# Patient Record
Sex: FEMALE | Race: WHITE | Employment: OTHER | URBAN - NONMETROPOLITAN AREA
[De-identification: names, ages, dates, MRNs, and addresses within clinical notes are randomized per-mention and may not be internally consistent; named-entity substitution may affect disease eponyms.]

---

## 2020-12-31 ENCOUNTER — HOSPITAL ENCOUNTER (OUTPATIENT)
Age: 55
Discharge: HOME | End: 2020-12-31
Payer: MEDICAID

## 2020-12-31 PROCEDURE — U0003 INFECTIOUS AGENT DETECTION BY NUCLEIC ACID (DNA OR RNA); SEVERE ACUTE RESPIRATORY SYNDROME CORONAVIRUS 2 (SARS-COV-2) (CORONAVIRUS DISEASE [COVID-19]), AMPLIFIED PROBE TECHNIQUE, MAKING USE OF HIGH THROUGHPUT TECHNOLOGIES AS DESCRIBED BY CMS-2020-01-R: HCPCS

## 2021-01-05 LAB — SARS-COV-2: NOT DETECTED

## 2022-07-20 ENCOUNTER — APPOINTMENT (OUTPATIENT)
Dept: MRI IMAGING | Age: 57
DRG: 054 | End: 2022-07-20
Attending: INTERNAL MEDICINE
Payer: MEDICAID

## 2022-07-20 ENCOUNTER — HOSPITAL ENCOUNTER (INPATIENT)
Age: 57
LOS: 2 days | Discharge: HOME HEALTH | DRG: 054 | End: 2022-07-22
Attending: INTERNAL MEDICINE | Admitting: INTERNAL MEDICINE
Payer: MEDICAID

## 2022-07-20 ENCOUNTER — APPOINTMENT (OUTPATIENT)
Dept: ULTRASOUND IMAGING | Age: 57
DRG: 054 | End: 2022-07-20
Attending: INTERNAL MEDICINE
Payer: MEDICAID

## 2022-07-20 DIAGNOSIS — R29.90 STROKE-LIKE SYMPTOMS: ICD-10-CM

## 2022-07-20 DIAGNOSIS — G43.009 ATYPICAL MIGRAINE: Primary | ICD-10-CM

## 2022-07-20 LAB
ANION GAP SERPL CALCULATED.3IONS-SCNC: 7 MEQ/L (ref 8–16)
AVERAGE GLUCOSE: 117 MG/DL (ref 70–126)
BUN BLDV-MCNC: 17 MG/DL (ref 7–22)
CALCIUM SERPL-MCNC: 9.3 MG/DL (ref 8.5–10.5)
CHLORIDE BLD-SCNC: 106 MEQ/L (ref 98–111)
CHOLESTEROL, TOTAL: 119 MG/DL (ref 100–199)
CO2: 27 MEQ/L (ref 23–33)
CREAT SERPL-MCNC: 0.8 MG/DL (ref 0.4–1.2)
EKG ATRIAL RATE: 66 BPM
EKG ATRIAL RATE: 68 BPM
EKG P AXIS: 57 DEGREES
EKG P AXIS: 66 DEGREES
EKG P-R INTERVAL: 168 MS
EKG P-R INTERVAL: 168 MS
EKG Q-T INTERVAL: 470 MS
EKG Q-T INTERVAL: 490 MS
EKG QRS DURATION: 154 MS
EKG QRS DURATION: 158 MS
EKG QTC CALCULATION (BAZETT): 499 MS
EKG QTC CALCULATION (BAZETT): 513 MS
EKG R AXIS: -13 DEGREES
EKG R AXIS: -16 DEGREES
EKG T AXIS: 145 DEGREES
EKG T AXIS: 158 DEGREES
EKG VENTRICULAR RATE: 66 BPM
EKG VENTRICULAR RATE: 68 BPM
ERYTHROCYTE [DISTWIDTH] IN BLOOD BY AUTOMATED COUNT: 13.3 % (ref 11.5–14.5)
ERYTHROCYTE [DISTWIDTH] IN BLOOD BY AUTOMATED COUNT: 45.3 FL (ref 35–45)
GFR SERPL CREATININE-BSD FRML MDRD: 74 ML/MIN/1.73M2
GLUCOSE BLD-MCNC: 99 MG/DL (ref 70–108)
HBA1C MFR BLD: 5.9 % (ref 4.4–6.4)
HCT VFR BLD CALC: 43.4 % (ref 37–47)
HDLC SERPL-MCNC: 35 MG/DL
HEMOGLOBIN: 14.1 GM/DL (ref 12–16)
LDL CHOLESTEROL CALCULATED: 55 MG/DL
LV EF: 55 %
LVEF MODALITY: NORMAL
MCH RBC QN AUTO: 30.3 PG (ref 26–33)
MCHC RBC AUTO-ENTMCNC: 32.5 GM/DL (ref 32.2–35.5)
MCV RBC AUTO: 93.1 FL (ref 81–99)
PLATELET # BLD: 322 THOU/MM3 (ref 130–400)
PMV BLD AUTO: 10.4 FL (ref 9.4–12.4)
POTASSIUM REFLEX MAGNESIUM: 4.6 MEQ/L (ref 3.5–5.2)
RBC # BLD: 4.66 MILL/MM3 (ref 4.2–5.4)
SODIUM BLD-SCNC: 140 MEQ/L (ref 135–145)
TRIGL SERPL-MCNC: 143 MG/DL (ref 0–199)
TSH SERPL DL<=0.05 MIU/L-ACNC: 1.18 UIU/ML (ref 0.4–4.2)
WBC # BLD: 10.5 THOU/MM3 (ref 4.8–10.8)

## 2022-07-20 PROCEDURE — 83036 HEMOGLOBIN GLYCOSYLATED A1C: CPT

## 2022-07-20 PROCEDURE — 85027 COMPLETE CBC AUTOMATED: CPT

## 2022-07-20 PROCEDURE — 76536 US EXAM OF HEAD AND NECK: CPT

## 2022-07-20 PROCEDURE — 70551 MRI BRAIN STEM W/O DYE: CPT

## 2022-07-20 PROCEDURE — 84443 ASSAY THYROID STIM HORMONE: CPT

## 2022-07-20 PROCEDURE — 36415 COLL VENOUS BLD VENIPUNCTURE: CPT

## 2022-07-20 PROCEDURE — 2060000000 HC ICU INTERMEDIATE R&B

## 2022-07-20 PROCEDURE — 72148 MRI LUMBAR SPINE W/O DYE: CPT

## 2022-07-20 PROCEDURE — 6360000002 HC RX W HCPCS

## 2022-07-20 PROCEDURE — 80061 LIPID PANEL: CPT

## 2022-07-20 PROCEDURE — 93306 TTE W/DOPPLER COMPLETE: CPT

## 2022-07-20 PROCEDURE — 72146 MRI CHEST SPINE W/O DYE: CPT

## 2022-07-20 PROCEDURE — 99223 1ST HOSP IP/OBS HIGH 75: CPT

## 2022-07-20 PROCEDURE — 72141 MRI NECK SPINE W/O DYE: CPT

## 2022-07-20 PROCEDURE — 6370000000 HC RX 637 (ALT 250 FOR IP)

## 2022-07-20 PROCEDURE — 93010 ELECTROCARDIOGRAM REPORT: CPT | Performed by: INTERNAL MEDICINE

## 2022-07-20 PROCEDURE — 80048 BASIC METABOLIC PNL TOTAL CA: CPT

## 2022-07-20 PROCEDURE — 93005 ELECTROCARDIOGRAM TRACING: CPT

## 2022-07-20 RX ORDER — ASPIRIN 300 MG/1
300 SUPPOSITORY RECTAL DAILY
Status: DISCONTINUED | OUTPATIENT
Start: 2022-07-20 | End: 2022-07-22 | Stop reason: HOSPADM

## 2022-07-20 RX ORDER — AMLODIPINE BESYLATE 10 MG/1
10 TABLET ORAL 2 TIMES DAILY
Status: DISCONTINUED | OUTPATIENT
Start: 2022-07-20 | End: 2022-07-22 | Stop reason: HOSPADM

## 2022-07-20 RX ORDER — ASPIRIN 81 MG/1
81 TABLET ORAL DAILY
Status: DISCONTINUED | OUTPATIENT
Start: 2022-07-20 | End: 2022-07-22 | Stop reason: HOSPADM

## 2022-07-20 RX ORDER — ONDANSETRON 2 MG/ML
4 INJECTION INTRAMUSCULAR; INTRAVENOUS EVERY 6 HOURS PRN
Status: DISCONTINUED | OUTPATIENT
Start: 2022-07-20 | End: 2022-07-22 | Stop reason: HOSPADM

## 2022-07-20 RX ORDER — SPIRONOLACTONE 50 MG/1
50 TABLET, FILM COATED ORAL DAILY
COMMUNITY

## 2022-07-20 RX ORDER — POLYETHYLENE GLYCOL 3350 17 G/17G
17 POWDER, FOR SOLUTION ORAL DAILY PRN
Status: DISCONTINUED | OUTPATIENT
Start: 2022-07-20 | End: 2022-07-22 | Stop reason: HOSPADM

## 2022-07-20 RX ORDER — ACETAMINOPHEN 325 MG/1
650 TABLET ORAL EVERY 4 HOURS PRN
Status: DISCONTINUED | OUTPATIENT
Start: 2022-07-20 | End: 2022-07-22 | Stop reason: HOSPADM

## 2022-07-20 RX ORDER — CARVEDILOL 25 MG/1
25 TABLET ORAL 2 TIMES DAILY WITH MEALS
COMMUNITY

## 2022-07-20 RX ORDER — ENOXAPARIN SODIUM 100 MG/ML
40 INJECTION SUBCUTANEOUS EVERY 24 HOURS
Status: DISCONTINUED | OUTPATIENT
Start: 2022-07-20 | End: 2022-07-22 | Stop reason: HOSPADM

## 2022-07-20 RX ORDER — HYDRALAZINE HYDROCHLORIDE 50 MG/1
50 TABLET, FILM COATED ORAL 3 TIMES DAILY
Status: ON HOLD | COMMUNITY
End: 2022-07-22 | Stop reason: SDUPTHER

## 2022-07-20 RX ORDER — ATORVASTATIN CALCIUM 80 MG/1
80 TABLET, FILM COATED ORAL DAILY
Status: DISCONTINUED | OUTPATIENT
Start: 2022-07-20 | End: 2022-07-22 | Stop reason: HOSPADM

## 2022-07-20 RX ORDER — CLOPIDOGREL BISULFATE 75 MG/1
75 TABLET ORAL DAILY
Status: DISCONTINUED | OUTPATIENT
Start: 2022-07-20 | End: 2022-07-22 | Stop reason: HOSPADM

## 2022-07-20 RX ORDER — ATORVASTATIN CALCIUM 80 MG/1
80 TABLET, FILM COATED ORAL DAILY
COMMUNITY

## 2022-07-20 RX ORDER — LISINOPRIL 40 MG/1
40 TABLET ORAL DAILY
Status: DISCONTINUED | OUTPATIENT
Start: 2022-07-20 | End: 2022-07-22 | Stop reason: HOSPADM

## 2022-07-20 RX ORDER — CARVEDILOL 25 MG/1
25 TABLET ORAL 2 TIMES DAILY WITH MEALS
Status: DISCONTINUED | OUTPATIENT
Start: 2022-07-20 | End: 2022-07-22 | Stop reason: HOSPADM

## 2022-07-20 RX ORDER — SPIRONOLACTONE 25 MG/1
50 TABLET ORAL DAILY
Status: DISCONTINUED | OUTPATIENT
Start: 2022-07-20 | End: 2022-07-22 | Stop reason: HOSPADM

## 2022-07-20 RX ORDER — LISINOPRIL 40 MG/1
40 TABLET ORAL DAILY
Status: ON HOLD | COMMUNITY
End: 2022-07-22 | Stop reason: SDUPTHER

## 2022-07-20 RX ORDER — AMLODIPINE BESYLATE 5 MG/1
10 TABLET ORAL 2 TIMES DAILY
COMMUNITY

## 2022-07-20 RX ORDER — MAGNESIUM SULFATE IN WATER 40 MG/ML
2000 INJECTION, SOLUTION INTRAVENOUS PRN
Status: DISCONTINUED | OUTPATIENT
Start: 2022-07-20 | End: 2022-07-22 | Stop reason: HOSPADM

## 2022-07-20 RX ORDER — POTASSIUM CHLORIDE 20 MEQ/1
40 TABLET, EXTENDED RELEASE ORAL PRN
Status: DISCONTINUED | OUTPATIENT
Start: 2022-07-20 | End: 2022-07-22 | Stop reason: HOSPADM

## 2022-07-20 RX ORDER — POTASSIUM CHLORIDE 7.45 MG/ML
10 INJECTION INTRAVENOUS PRN
Status: DISCONTINUED | OUTPATIENT
Start: 2022-07-20 | End: 2022-07-22 | Stop reason: HOSPADM

## 2022-07-20 RX ORDER — NITROGLYCERIN 0.3 MG/1
0.3 TABLET SUBLINGUAL EVERY 5 MIN PRN
COMMUNITY

## 2022-07-20 RX ORDER — ONDANSETRON 4 MG/1
4 TABLET, ORALLY DISINTEGRATING ORAL EVERY 8 HOURS PRN
Status: DISCONTINUED | OUTPATIENT
Start: 2022-07-20 | End: 2022-07-22 | Stop reason: HOSPADM

## 2022-07-20 RX ORDER — CLOPIDOGREL BISULFATE 75 MG/1
75 TABLET ORAL DAILY
COMMUNITY

## 2022-07-20 RX ADMIN — ENOXAPARIN SODIUM 40 MG: 100 INJECTION SUBCUTANEOUS at 21:41

## 2022-07-20 RX ADMIN — CARVEDILOL 25 MG: 25 TABLET, FILM COATED ORAL at 16:45

## 2022-07-20 RX ADMIN — ASPIRIN 81 MG: 81 TABLET, COATED ORAL at 14:40

## 2022-07-20 RX ADMIN — ATORVASTATIN CALCIUM 80 MG: 80 TABLET, FILM COATED ORAL at 16:45

## 2022-07-20 RX ADMIN — ACETAMINOPHEN 325MG 650 MG: 325 TABLET ORAL at 16:37

## 2022-07-20 RX ADMIN — CLOPIDOGREL BISULFATE 75 MG: 75 TABLET ORAL at 16:37

## 2022-07-20 ASSESSMENT — ENCOUNTER SYMPTOMS
DIARRHEA: 0
VOMITING: 0
RHINORRHEA: 0
ABDOMINAL PAIN: 0
SHORTNESS OF BREATH: 0
CONSTIPATION: 0
CHEST TIGHTNESS: 0
EYE PAIN: 0
SORE THROAT: 0
NAUSEA: 0

## 2022-07-20 ASSESSMENT — PAIN SCALES - GENERAL: PAINLEVEL_OUTOF10: 0

## 2022-07-20 ASSESSMENT — PAIN DESCRIPTION - LOCATION: LOCATION: HEAD

## 2022-07-20 ASSESSMENT — LIFESTYLE VARIABLES
HOW MANY STANDARD DRINKS CONTAINING ALCOHOL DO YOU HAVE ON A TYPICAL DAY: PATIENT DOES NOT DRINK
HOW OFTEN DO YOU HAVE A DRINK CONTAINING ALCOHOL: NEVER

## 2022-07-20 NOTE — PLAN OF CARE
Problem: Discharge Planning  Goal: Discharge to home or other facility with appropriate resources  Outcome: Progressing  Flowsheets  Taken 7/20/2022 1204 by Cathie Vasques RN  Discharge to home or other facility with appropriate resources: Identify barriers to discharge with patient and caregiver     Problem: Pain  Goal: Verbalizes/displays adequate comfort level or baseline comfort level  Outcome: Progressing  Flowsheets (Taken 7/20/2022 1740)  Verbalizes/displays adequate comfort level or baseline comfort level:   Encourage patient to monitor pain and request assistance   Assess pain using appropriate pain scale   Administer analgesics based on type and severity of pain and evaluate response     Problem: Safety - Adult  Goal: Free from fall injury  Outcome: Progressing  Flowsheets (Taken 7/20/2022 1734)  Free From Fall Injury: Instruct family/caregiver on patient safety     Problem: Neurosensory - Adult  Goal: Achieves stable or improved neurological status  Outcome: Progressing  Flowsheets (Taken 7/20/2022 1204)  Achieves stable or improved neurological status: Assess for and report changes in neurological status     Problem: Neurosensory - Adult  Goal: Absence of seizures  Outcome: Progressing  Flowsheets (Taken 7/20/2022 1204)  Absence of seizures: Monitor for seizure activity.   If seizure occurs, document type and location of movements and any associated apnea     Problem: Neurosensory - Adult  Goal: Remains free of injury related to seizures activity  Outcome: Progressing  Flowsheets (Taken 7/20/2022 1204)  Remains free of injury related to seizure activity: Maintain airway, patient safety  and administer oxygen as ordered     Problem: Neurosensory - Adult  Goal: Achieves maximal functionality and self care  Outcome: Progressing  Flowsheets (Taken 7/20/2022 1204)  Achieves maximal functionality and self care: Monitor swallowing and airway patency with patient fatigue and changes in neurological status Problem: Skin/Tissue Integrity - Adult  Goal: Skin integrity remains intact  Outcome: Progressing  Flowsheets  Taken 7/20/2022 1739  Skin Integrity Remains Intact: Monitor for areas of redness and/or skin breakdown  Taken 7/20/2022 1204  Skin Integrity Remains Intact: Monitor for areas of redness and/or skin breakdown     Problem: Skin/Tissue Integrity - Adult  Goal: Incisions, wounds, or drain sites healing without S/S of infection  Outcome: Progressing  Flowsheets  Taken 7/20/2022 1739  Incisions, Wounds, or Drain Sites Healing Without Sign and Symptoms of Infection: ADMISSION and DAILY: Assess and document risk factors for pressure ulcer development  Taken 7/20/2022 1204  Incisions, Wounds, or Drain Sites Healing Without Sign and Symptoms of Infection: ADMISSION and DAILY: Assess and document risk factors for pressure ulcer development     Problem: Skin/Tissue Integrity - Adult  Goal: Oral mucous membranes remain intact  Outcome: Progressing  Flowsheets  Taken 7/20/2022 1739  Oral Mucous Membranes Remain Intact: Assess oral mucosa and hygiene practices  Taken 7/20/2022 1204  Oral Mucous Membranes Remain Intact:   Assess oral mucosa and hygiene practices   Implement preventative oral hygiene regimen     Problem: Confusion  Goal: Confusion, delirium, dementia, or psychosis is improved or at baseline  Description: INTERVENTIONS:  1.  Assess for possible contributors to thought disturbance, including medications, impaired vision or hearing, underlying metabolic abnormalities, dehydration, psychiatric diagnoses, and notify attending LIP  Outcome: Progressing  Flowsheets (Taken 7/20/2022 1204)  Effect of thought disturbance (confusion, delirium, dementia, or psychosis) are managed with adequate functional status: Monitor and intervene to maintain adequate nutrition, hydration, elimination, sleep and activity     Problem: Abuse/Neglect  Goal: Pt/Caregiver aware of resources to assist with issues of abuse and neglect  Description: INTERVENTIONS:  1. Assess for level of risk and safety  2. Initiate referral to Social Work and notify Licensed Independent Practictioner ([facility])  3. Provide appropriate education and resources to patient and/or family  4. Initiate referral to Adult MISHA Arroyo, as appropriate  5. Initiate referral to CARMEN Rossi, as appropriate  6. Offer to have the patient's the patient's chart marked as Non-disclosed/Privacy patient for phone inquiries, as appropriate  7. Provide emotional support, including active listening and acknowledgment of concerns  Outcome: Progressing  Flowsheets (Taken 7/20/2022 1204)  Patient/caregiver aware of resources to assist with issues of abuse and neglect: Assess for level of risk and safety     Problem: ABCDS Injury Assessment  Goal: Absence of physical injury  Outcome: Progressing  Flowsheets (Taken 7/20/2022 1740)  Absence of Physical Injury: Implement safety measures based on patient assessment     Care plan reviewed with patient. Patient verbalize understanding of the plan of care and contribute to goal setting.

## 2022-07-20 NOTE — H&P
Hospitalist - History & Physical      Patient: Sorin Pena    Unit/Bed:4A-19/019-A  YOB: 1965  MRN: [id number]   Acct: [de-identified]   PCP: Elayne Dubin, APRN - CNP    Date of Service: Pt seen/examined on 07/20/22  and Admitted to Inpatient with expected LOS greater than two midnights due to medical therapy. Chief Complaint:  numbness and tingling of face and right leg pain    Assessment and Plan:  Concern for stroke:    Direct admit from [facility]. Pt reports sudden onset facial pain in the right CN V1, V2 distribution, right radicular leg pain, 1 episode of confusion and disorientation this AM. Confusion and disorientation have resolved. No new weakness or other gross neurologic abnormalities appreciated on exam. Last known well 10 pm 7/18. Imaging uploaded to EMR, CT head WO contrast at MR reveals no acute intracranial hemorrhage or mass effect; age-indeterminate infarct within anterior limb of right internal capsule. CT angiogram of neck reveals 1) scattered calcific plaque without flow-limiting stenosis in anterior circulation; patent vertebral arteries. 2) Subtle atherosclerotic irregularity involving mid and distal PICA distribution on the left without large vessel occlusion. 3) Low density in the lentiform nucleus and internal capsule on right with adjacent compensatory enlargement of ventricular system consistent with an area of chronic infarction. Given total 324mg ASA yesterday. Neurology consulted to  consider MRI and further workup and recommendations. ECHO with bubble study ordered. PT/OT. Holding antihypertensives for now to allow for permissive HTN. BMP, CBC, Lipid panel, TSH, Vit B12 and folate ordered. Hypodense mass of left lobe of thyroid:   Noted on the CTA neck at MR. Mass measures 22.91 x 13.41 mm in diameter. TSH with reflex, Thyroid US ordered. Essential HTN:    BP appears controlled right now, however noted to be accelerated upon presentation at MR.  Hold lisinopril, amlodipine, hydralazine. Will continue to monitor. CAD:   Pt does not endorse chest pain at this time. EKG shows NSR with possible Left atrial enlargement, LBBB; no acute ischemic changes. LBBB noted on previous EKG in 10/2021. Trop negative x 2 at MR. Continue home meds. Continue to monitor. Telemetry. HFrEF:    Stress test at [facility] 10/2021 reveals EF 49%. Pt appears compensated today. Continue home meds. Daily weights, I&Os. COPD:    At baseline on RA. Hx of smoking, currently smokes 10 cigarettes per day. History Of Present Illness:    Dereck is a 65 y/o  female with a PMHx of CAD, COPD, HLD, HTN who [facility] admit today for the evaluation of numbness and tingling of face and intermittent right leg pain. Pt reports yesterday morning she woke up from sleep and noticed head pain that was located more on the right side of her face in the forehead and around her right eye, that occasionally would radiate to the back of the head. She denies vision changes, but states she also has associated right eye tearing. This morning she also experienced a self-limiting episode of confusion and disorientation, and significant generalized weakness for which she was unable to get out of bed without assistance. She also notes new right hip pain that is intermittent. The pain is severe, radiates down her right leg. She denies weakness with walking, denies alleviating or aggravating factors. Pt denies trauma. She reports smoking 10 cigarettes per day. Denies illicit drug use. She otherwise denies chest pain, shortness of breath, recent illness, fever, chills, abdominal pain, nausea or vomiting.        Past Medical History:        Diagnosis Date    CAD (coronary artery disease)     COPD (chronic obstructive pulmonary disease) ([facility] [address]     Hyperlipidemia     Hypertension        Past Surgical History:        Procedure Laterality Date    BREAST SURGERY      CARDIAC SURGERY Home Medications:   No current facility-administered medications on file prior to encounter. No current outpatient medications on file prior to encounter. Allergies:    Patient has no known allergies. Social History:    reports that she has been smoking cigarettes. She has a 13.00 pack-year smoking history. She has never used smokeless tobacco. She reports that she does not drink alcohol and does not use drugs. Family History:   History reviewed. No pertinent family history. Diet:  Diet NPO    Review of systems:     Review of Systems   Constitutional:  Positive for activity change. Negative for appetite change, chills, fatigue and fever. HENT:  Negative for congestion, rhinorrhea and sore throat. Eyes:  Negative for pain and visual disturbance. Respiratory:  Negative for chest tightness and shortness of breath. Cardiovascular:  Negative for chest pain and leg swelling. Gastrointestinal:  Negative for abdominal pain, constipation, diarrhea, nausea and vomiting. Genitourinary:  Negative for frequency and urgency. Musculoskeletal:  Negative for gait problem and myalgias. Neurological:  Positive for numbness. Negative for dizziness, seizures, syncope, facial asymmetry, speech difficulty, weakness, light-headedness and headaches. Psychiatric/Behavioral:  Positive for confusion. The patient is not nervous/anxious. PHYSICAL EXAM:  /79   Pulse 73   Temp 97.6 °F (36.4 °C) (Oral)   Resp 16   SpO2 96%   General appearance: No apparent distress. Appears stated age and cooperative. Skin: Skin color, texture, turgor normal.  No rashes or lesions. HEENT: Normal cephalic, atraumatic without obvious deformity. Pupils equal, round, and reactive to light. Extra-ocular muscles intact. Conjunctivae/corneas clear. Neck: Trachea midline. Supple, with full range of motion. No jugular venous distention. Cardiovascular: Regular rate and rhythm with normal S1/S2.  No murmurs, rubs or gallops. Respiratory:  Normal respiratory effort. Clear to auscultation, bilaterally without rales, wheezes, or rhonchi. Abdomen: Soft, non-tender, non-distended. Normal bowel sounds. Musculoskeletal: Scant edema noted in LE bilaterally. No weakness or instability noted. No  erythema, or gross deformity noted. Vascular:  Pulses +2 palpable, equal bilaterally. Neurologic:  CN II-XII grossly intact, however paraesthesias noted in CN V1, V2. No weakness noted on exam in the upper or lower extremities. Neurovascularly intact without any focal sensory/motor deficits. Psychiatric: Alert and oriented, thought content appropriate, normal insight      Labs:   No results for input(s): WBC, HGB, HCT, PLT in the last 72 hours. No results for input(s): NA, K, CL, CO2, BUN, CREATININE, CALCIUM, PHOS in the last 72 hours. Invalid input(s): MAGNES  No results for input(s): AST, ALT, BILIDIR, BILITOT, ALKPHOS in the last 72 hours. No results for input(s): INR in the last 72 hours. No results for input(s): [place] Pears in the last 72 hours. Urinalysis:    No results found for: José Miguel Drone, BACTERIA, RBCUA, BLOODU, [address], [address]    Radiology:   No orders to display     No results found.       EKG:  NSR, Possible left atrial enlargment, LBBB    Electronically signed by CHARLES PEDERSEN [facility] PA-C on 7/20/2022 at 12:29 PM

## 2022-07-20 NOTE — CONSULTS
Neurology Consult Note    Date:7/21/2022       [id number]  Patient Name:Henna Birmingham     YOB: 1965     Age:56 y.o. Requesting Physician: Christine Steve PA-C     Reason for Consult:  Evaluate for stroke-like symptoms    Chief Complaint: Numbness and tingling of right side of face, headache, bilateral lower extremity pain and weakness    Subjective     Arpita Elizalde is a 64 y.o. female with a history of CAD s/p stent placement x5, COPD, HLD, HTN, MI who presents to [facility] as a direct admit from [facility] for the evaluation of paresthesias of right side of face and intermittent bilateral leg pain and weakness. Patient reports symptoms starting yesterday morning when she woke up with a right-sided headache with radiation occipitally, right-sided neck pain, intermittent bilateral visual changes, disorientation, dizziness, increased confusion, chest pain, slurred and garbled speech and generalized weakness. She denies any known history of stroke, but reports family history of stroke: mother, father, brother (age 39). Patient admits to extensive smoking history since she was 15years old. She currently smokes half a pack a day, but used to be a 2 pack/day smoker. Associated symptoms include low back pain with radicular symptoms, incontinence of bladder. Also admits to dysuria, flank pain, increased frequency of urination x1 week. Reports resolution of confusion disorientation, CT head without contrast at outside facility negative for acute findings, age-indeterminate infarct within anterior limb of right internal capsule.  CTA of the neck showing scattered calcified plaques without flow-limiting stenosis of anterior circulation, patent vertebral arteries, subtle atherosclerotic irregularity involving mid and distal PICA distribution on the left without large vessel occlusion, Low density in the lentiform nucleus and internal capsule on right with adjacent compensatory enlargement of ventricular system consistent with an area of chronic infarction. She was loaded with aspirin 324 mg yesterday. Currently on a baby aspirin, Lipitor 80 mg and Plavix 75 mg and reports compliance with these medications prior to presentation to [facility]. Review of Systems   Review of Systems   Constitutional:  Positive for chills, diaphoresis and fatigue. Negative for fever. HENT:  Positive for rhinorrhea and sinus pressure. Negative for sore throat. Eyes:  Positive for pain and visual disturbance. Respiratory:  Positive for cough and shortness of breath. Cardiovascular:  Negative for palpitations. Gastrointestinal:  Positive for constipation. Negative for abdominal pain, nausea and vomiting. Genitourinary:  Positive for dysuria, flank pain and frequency. Musculoskeletal:  Positive for back pain, myalgias and neck pain. Negative for arthralgias. Skin:  Negative for rash. Neurological:  Positive for dizziness, weakness, numbness and headaches. Psychiatric/Behavioral:  Positive for confusion. The patient is not nervous/anxious. Medications   Scheduled Meds:    enoxaparin  40 mg SubCUTAneous Q24H    aspirin  81 mg Oral Daily    Or    aspirin  300 mg Rectal Daily    [Held by provider] amLODIPine  10 mg Oral BID    atorvastatin  80 mg Oral Daily    [Held by provider] carvedilol  25 mg Oral BID WC    clopidogrel  75 mg Oral Daily    [Held by provider] lisinopril  40 mg Oral Daily    [Held by provider] spironolactone  50 mg Oral Daily     Continuous Infusions:   PRN Meds: ondansetron **OR** ondansetron, polyethylene glycol, potassium chloride **OR** potassium alternative oral replacement **OR** potassium chloride, magnesium sulfate, acetaminophen  Medications Prior to Admission:   No current facility-administered medications on file prior to encounter.      Current Outpatient Medications on File Prior to Encounter   Medication Sig Dispense Refill    atorvastatin (LIPITOR) 80 MG tablet Take 80 mg by mouth in the morning. lisinopril (PRINIVIL;ZESTRIL) 40 MG tablet Take 40 mg by mouth in the morning. clopidogrel (PLAVIX) 75 MG tablet Take 75 mg by mouth in the morning. spironolactone (ALDACTONE) 50 MG tablet Take 50 mg by mouth in the morning. amLODIPine (NORVASC) 5 MG tablet Take 10 mg by mouth in the morning and 10 mg in the evening. nitroGLYCERIN (NITROSTAT) 0.3 MG SL tablet Place 0.3 mg under the tongue every 5 minutes as needed for Chest pain up to max of 3 total doses. If no relief after 1 dose, call 911.      hydrALAZINE (APRESOLINE) 50 MG tablet Take 50 mg by mouth in the morning and 50 mg at noon and 50 mg before bedtime. carvedilol (COREG) 25 MG tablet Take 25 mg by mouth in the morning and 25 mg in the evening. Take with meals. Past History    Past Medical History:   has a past medical history of CAD (coronary artery disease), COPD (chronic obstructive pulmonary disease) ([address] Hyperlipidemia, and Hypertension. Social History:   reports that she has been smoking cigarettes. She has a 13.00 pack-year smoking history. She has never used smokeless tobacco. She reports that she does not drink alcohol and does not use drugs. Family History: History reviewed. No pertinent family history. Physical Examination      Vitals:  /74   Pulse 61   Temp 97.7 °F (36.5 °C) (Oral)   Resp 16   Ht 5' 3\" (1.6 m)   Wt 148 lb (67.1 kg)   SpO2 94%   BMI 26.22 kg/m²   Temp (24hrs), Av.7 °F (36.5 °C), Min:97.6 °F (36.4 °C), Max:97.7 °F (36.5 °C)      I/O (24Hr): Intake/Output Summary (Last 24 hours) at 2022 0159  Last data filed at 2022 2337  Gross per 24 hour   Intake 520 ml   Output --   Net 520 ml         Physical Exam  Vitals reviewed. Constitutional:       General: She is not in acute distress. Appearance: Normal appearance. She is not ill-appearing. HENT:      Head: Normocephalic and atraumatic.       Right Ear: External ear normal.      Left Ear: External ear normal.      Nose: Nose normal.      Mouth/Throat:      Mouth: Mucous membranes are moist.      Pharynx: No oropharyngeal exudate or posterior oropharyngeal erythema. Eyes:      Extraocular Movements: Extraocular movements intact and EOM normal.      Pupils: Pupils are equal, round, and reactive to light. Cardiovascular:      Rate and Rhythm: Normal rate and regular rhythm. Pulses: Normal pulses. Pulmonary:      Effort: Pulmonary effort is normal. No respiratory distress. Breath sounds: Normal breath sounds. Abdominal:      General: Bowel sounds are normal.      Palpations: Abdomen is soft. Tenderness: There is no abdominal tenderness. Musculoskeletal:         General: No tenderness. Normal range of motion. Cervical back: Normal range of motion. Right lower leg: No edema. Left lower leg: No edema. Skin:     General: Skin is warm. Findings: No rash. Neurological:      Mental Status: She is alert and oriented to person, place, and time. Coordination: Finger-Nose-Finger Test and Heel to [facility] Test normal.      Deep Tendon Reflexes: Strength normal.      Reflex Scores:       Tricep reflexes are 3+ on the right side and 3+ on the left side. Bicep reflexes are 3+ on the right side and 3+ on the left side. Brachioradialis reflexes are 3+ on the right side and 3+ on the left side. Patellar reflexes are 3+ on the right side and 3+ on the left side. Achilles reflexes are 3+ on the right side and 3+ on the left side. Psychiatric:         Mood and Affect: Mood normal.         Speech: Speech normal.         Behavior: Behavior normal.     Neurologic Exam     Mental Status   Oriented to person, place, and time. Follows 2 step commands. Attention: normal. Concentration: normal.   Speech: speech is normal   Level of consciousness: alert  Normal comprehension. Cranial Nerves     CN II   Visual fields full to confrontation.    Right last 72 hours. LIVER PROFILE:No results for input(s): AST, ALT, BILIDIR, BILITOT, ALKPHOS in the last 72 hours. CHOLESTEROL AND A1C:  Recent Labs     07/20/22  1331   LDLCALC 55   HDL 35   CHOL 119   TRIG 143   LABA1C 5.9      Imaging Last 24 Hours:  ECHO Complete 2D W Doppler W Color    Result Date: 7/20/2022  Transthoracic Echocardiography Report (TTE)  Demographics   Patient Name     Nicole Smith   Gender                Female   MR #             [id number]   Race                                                  Ethnicity   Account #        [de-identified]   Room Number           5456   Accession Number [id number]  Date of Study         07/20/2022   Date of Birth    1965  Referring Physician   Yelena Westbrook PA-C   Age              64 year(s)  Malissa Mejia RDCS                                Interpreting          Jona Yost MD                               Physician             Echo reader of the week  Procedure Type of Study   TTE procedure:ECHOCARDIOGRAM COMPLETE 2D W DOPPLER W COLOR. Procedure Date Date: 07/20/2022 Start: 03:47 PM Study Location: Bedside Technical Quality: Adequate visualization Indications:Possible stroke. Additional Medical History:Hypertension, Hyperlipidemia, COPD, Smoker, Coronary artery disease. Patient Status: Routine Contrast Medium: Bubble Study. Height: 63 inches Weight: 148 pounds BSA: 1.7 m^2 BMI: 26.22 kg/m^2 BP: 105/79 mmHg  Conclusions   Summary  Ejection fraction is visually estimated at 55%. Overall left ventricular function is normal.  Moderate concentric left ventricular hypertrophy. Septal wall asymmetrical left ventricular hypertrophy. Mildly dilated left atrium. Aortic valve appears possibly bicuspid. Aortic valve leaflets are somewhat thickened.   cant exclude some degree of stenosis which is difficult to quantitate due  to elevated LVOT velocity with LVOT obstruction that is significant with  mean gradient 44 mm hg  Mildly dilated left atrium. bubble contrast study with no shunting   Signature   ----------------------------------------------------------------  Electronically signed by Graham Rodrigues MD (Interpreting  physician) on 07/20/2022 at 06:57 PM  ----------------------------------------------------------------   Findings   Mitral Valve  Trace mitral regurgitation is present. Aortic Valve  Aortic valve appears possibly bicuspid. Aortic valve leaflets are somewhat thickened. cant exclude some degree of stenosis which is difficult to quantitate due  to elevated LVOT velocity with LVOT obstruction that is significant with  mean gradient 44 mm hg   Tricuspid Valve  Trivial tricuspid regurgitation visualized. Pulmonic Valve  Trivial pulmonic regurgitation visualized. The pulmonic valve was not well visualized . Left Atrium  Mildly dilated left atrium. bubble contrast study with no shunting   Left Ventricle  Ejection fraction is visually estimated at 55%. Overall left ventricular function is normal.  Moderate concentric left ventricular hypertrophy. Septal wall asymmetrical left ventricular hypertrophy. Right Atrium  Right atrial size was normal.   Right Ventricle  The right ventricular size was normal with normal systolic function and  wall thickness. Pericardial Effusion  The pericardium was normal in appearance with no evidence of a pericardial  effusion. Pleural Effusion  No evidence of pleural effusion. Aorta / Great Vessels  -Aortic root dimension within normal limits.  -The Pulmonary artery is within normal limits. -IVC size is within normal limits with normal respiratory phasic changes.   M-Mode/2D Measurements & Calculations   LV Diastolic    LV Systolic Dimension: 2.6  AV Cusp Separation: 1.8 cmAO  Dimension: 4 cm cm                          Root Dimension: 3.4 cmLA Area:  LV FS:35 %      LV Volume Diastolic: 70 ml  24.5 cm^2  LV PW LV Volume Systolic: 81.4 ml  Diastolic: 1.8  LV EDV/LV EDV Index: 70  cm              ml/41 m^2LV ESV/LV ESV  Septum          Index: 24.6 ml/14 m^2       RV Diastolic Dimension: 2.6 cm  Diastolic: 2 cm EF Calculated: 64.9 %                                              Ascending Aorta: 3.3 cm                                              LA volume/Index: 62.4 ml                                              /37m^2                  LVOT: 2 cm  Doppler Measurements & Calculations   MV Peak E-Wave: 102 cm/s   AV Peak Velocity: 297  MV Peak A-Wave: 112 cm/s   cm/s  MV E/A Ratio: 0.91         AV Peak Gradient:      TV Peak E-Wave: 63.4  MV Peak Gradient: 4.16     35.28 mmHg             cm/s  mmHg                       AV Mean Velocity: 198  TV Peak A-Wave: 41.6                             cm/s                   cm/s  MV Deceleration Time: 407  AV Mean Gradient: 18  msec                       mmHg                   TV Peak Gradient: 1.61                             AV VTI: 66.4 cm        mmHg                                                    TR Velocity:277 cm/s  MV E' Septal Velocity: 3.7                        TR Gradient:30.69 mmHg  cm/s                                              PV Peak Velocity: 59.6  MV A' Septal Velocity: 7.1 IVRT: 82 msec          cm/s  cm/s                                              PV Peak Gradient: 1.42  MV E' Lateral Velocity:                           mmHg  6.9 cm/s  MV A' Lateral Velocity:  9.7 cm/s  E/E' septal: 27.57  E/E' lateral: 14.78  MR Velocity: 530 cm/s  [email or web address] [id number]    MRI CERVICAL SPINE WO CONTRAST    Result Date: 7/20/2022  MR cervical spine without gadolinium Comparison: None Findings: Visualized intracranial contents are unremarkable. No cervical fluid collections or masses.  Cervical cord normal. Status post anterior metallic and interbody musculature intact. Findings at the level of the cervical and lumbar spine are reported separately. No significant abnormality of the thoracic spine. This document has been electronically signed by: Micah Apley, MD on 07/20/2022 10:29 PM    MRI LUMBAR SPINE WO CONTRAST    Result Date: 7/20/2022  MR lumbar spine without gadolinium Comparison: None Findings: No scoliosis or spondylolisthesis. No acute fracture or pathologic bone lesion. Possible chronic pars defects at L5. Small hemangiomas incidentally noted. The conus is at the T12 level. Paraspinous musculature intact. L1-L2: Mild broad-based disc bulge with minimal thecal sac effacement. No significant neural foraminal narrowing. L2-L3: Minimal broad-based disc bulge. No significant central canal or neural foraminal narrowing. L3-L4: Mild broad-based disc bulge with minimal thecal sac effacement. Mild facet osteoarthritis. No significant neural foraminal narrowing. L4-L5: 3 mm central disc protrusion with mild thecal sac effacement. Mild facet osteoarthritis. Mild bilateral neural foraminal narrowing. L5-S1: 2 mm central disc protrusion with mild thecal sac effacement. Mild facet osteoarthritis. Mild bilateral neural foraminal narrowing. 1. Possible chronic pars defects at L5. No associated alignment abnormality. 2. Tiny central disc protrusions at L4-L5 and L5-S1 with mild thecal sac effacement. 3. Additional chronic findings as above. This document has been electronically signed by: Micah Apley, MD on 07/20/2022 10:46 PM    CT COMPARISON OF OUTSIDE FILMS    Result Date: 7/20/2022  Radiology exam is complete. No Radiologist dictation. Please follow up with ordering provider. CT COMPARISON OF OUTSIDE FILMS    Result Date: 7/20/2022  Radiology exam is complete. No Radiologist dictation. Please follow up with ordering provider. MRI BRAIN WO CONTRAST    Result Date: 7/20/2022  MR Brain without gadolinium.  Comparison: None Findings: No restricted diffusion. No intracranial mass or hemorrhage. 1.4 cm chronic lacunar type infarct involving the right thalamus and right basal ganglia. Small focus of chronic infarction within the right cerebellar hemisphere. Involutional change of brain parenchyma, compatible with age. Numerous scattered foci of T2 hyperintensity within the white matter with involvement of both the superficial and deep white matter No midline shift. No hydrocephalus. Vascular flow voids are intact. The orbits are normal. The sinuses and mastoid air cells are clear. No focal bone lesion. 1. Numerous foci of T2 hyperintensity within the white matter. This may be on the basis of advanced small vessel ischemic change or a demyelinating process. 2. Chronic lacunar infarct involving the right basal ganglia and right thalamus. Small chronic infarct within the right cerebellar hemisphere. Flow voids This document has been electronically signed by: Osmany Rojas MD on 07/20/2022 10:41 PM    XR COMPARISON OF OUTSIDE FILMS    Result Date: 7/20/2022  Radiology exam is complete. No Radiologist dictation. Please follow up with ordering provider. Assessment and Plan:        Acute sensory changes, bilateral lower extremity pain and weakness, incontinence in the setting of low back pain and hyperreflexia  CT head without contrast from outside facility: Negative for acute findings, age indeterminate infarct with anterior limb of right internal capsule  CTA neck from outside facility: Negative for large vessel occlusion, scattered calcified plaque without flow-limiting stenosis of anterior circulation, atherosclerotic irregularity of PICA distribution. Chronic infarction right lentiform nucleus and anterior with enlargement of ventricular system  2D echo ordered. PT/OT consult  Continue ASA 81 mg, Plavix 75 mg, Lipitor 80 mg.    Maintain adequate fluid hydration  Antihypertensives held by hospitalist  MRI brain without contrast: Numerous foci of T2 hyperintensity within the white matter, demyelinating process versus small vessel ischemic change. chronic lacunar infarcts of the right basal ganglia, right thalamus, right cerebellar hemisphere, full read above. MRI C-spine: S/p anterior metallic and interbody fusion C5-C6. Grade 1 anterolisthesis and 4 mm central disc protrusion at C3-C4 with moderate mass-effect on the thecal sac and mild mass-effect on the anterior aspect of the cervical spinal cord. There is also moderate osteoarthritis of the left facet joint contributing to moderate stenosis of the left neural foramen at this level. MRI T-spine: Negative for significant abnormality. MRI L-spine:Possible chronic pars defects at L5 without associated alignment abnormality. Small central disc protrusions at L4-L5 and L5-S1 with mild thecal sac effacement  Recommend infectious workup. Particularly UA in setting of dysuria/frequency/incontinence. NIHSS every shift. Neurochecks every 4 hours, unless otherwise specified per floor. Call with neurologic changes. Neurology will continue to follow. Further recommendations to follow in the morning. This patient was seen & evaluated in conjunction with Dr. Phan Hernandez who is in agreement with assessment and plan.      Electronically signed by Jose Hand PA-C on 7/21/22 at 2:19 AM EDT

## 2022-07-21 ENCOUNTER — APPOINTMENT (OUTPATIENT)
Dept: CT IMAGING | Age: 57
DRG: 054 | End: 2022-07-21
Attending: INTERNAL MEDICINE
Payer: MEDICAID

## 2022-07-21 LAB
ANION GAP SERPL CALCULATED.3IONS-SCNC: 9 MEQ/L (ref 8–16)
BACTERIA: ABNORMAL
BILIRUBIN URINE: NEGATIVE
BLOOD, URINE: NEGATIVE
BUN BLDV-MCNC: 13 MG/DL (ref 7–22)
CALCIUM SERPL-MCNC: 8.4 MG/DL (ref 8.5–10.5)
CASTS: ABNORMAL /LPF
CASTS: ABNORMAL /LPF
CHARACTER, URINE: CLEAR
CHLORIDE BLD-SCNC: 108 MEQ/L (ref 98–111)
CO2: 27 MEQ/L (ref 23–33)
COLOR: YELLOW
CREAT SERPL-MCNC: 0.6 MG/DL (ref 0.4–1.2)
CRYSTALS: ABNORMAL
EKG ATRIAL RATE: 62 BPM
EKG P AXIS: 55 DEGREES
EKG P-R INTERVAL: 174 MS
EKG Q-T INTERVAL: 480 MS
EKG QRS DURATION: 162 MS
EKG QTC CALCULATION (BAZETT): 487 MS
EKG R AXIS: -25 DEGREES
EKG T AXIS: 150 DEGREES
EKG VENTRICULAR RATE: 62 BPM
EPITHELIAL CELLS, UA: ABNORMAL /HPF
ERYTHROCYTE [DISTWIDTH] IN BLOOD BY AUTOMATED COUNT: 13.4 % (ref 11.5–14.5)
ERYTHROCYTE [DISTWIDTH] IN BLOOD BY AUTOMATED COUNT: 46 FL (ref 35–45)
FOLATE: 13.1 NG/ML (ref 4.8–24.2)
GFR SERPL CREATININE-BSD FRML MDRD: > 90 ML/MIN/1.73M2
GLUCOSE BLD-MCNC: 114 MG/DL (ref 70–108)
GLUCOSE, URINE: NEGATIVE MG/DL
HCT VFR BLD CALC: 43.6 % (ref 37–47)
HEMOGLOBIN: 14.1 GM/DL (ref 12–16)
KETONES, URINE: ABNORMAL
LEUKOCYTE ESTERASE, URINE: NEGATIVE
MCH RBC QN AUTO: 30.5 PG (ref 26–33)
MCHC RBC AUTO-ENTMCNC: 32.3 GM/DL (ref 32.2–35.5)
MCV RBC AUTO: 94.4 FL (ref 81–99)
MISCELLANEOUS LAB TEST RESULT: ABNORMAL
NITRITE, URINE: NEGATIVE
PH UA: 5.5 (ref 5–9)
PLATELET # BLD: 313 THOU/MM3 (ref 130–400)
PMV BLD AUTO: 10.3 FL (ref 9.4–12.4)
POTASSIUM REFLEX MAGNESIUM: 3.8 MEQ/L (ref 3.5–5.2)
PROTEIN UA: NEGATIVE MG/DL
RBC # BLD: 4.62 MILL/MM3 (ref 4.2–5.4)
RBC URINE: ABNORMAL /HPF
RENAL EPITHELIAL, UA: ABNORMAL
SODIUM BLD-SCNC: 144 MEQ/L (ref 135–145)
SPECIFIC GRAVITY UA: 1.02 (ref 1–1.03)
UROBILINOGEN, URINE: 1 EU/DL (ref 0–1)
VITAMIN B-12: 349 PG/ML (ref 211–911)
WBC # BLD: 8.7 THOU/MM3 (ref 4.8–10.8)
WBC UA: ABNORMAL /HPF
YEAST: ABNORMAL

## 2022-07-21 PROCEDURE — 97530 THERAPEUTIC ACTIVITIES: CPT

## 2022-07-21 PROCEDURE — 97112 NEUROMUSCULAR REEDUCATION: CPT

## 2022-07-21 PROCEDURE — 6360000002 HC RX W HCPCS

## 2022-07-21 PROCEDURE — 80048 BASIC METABOLIC PNL TOTAL CA: CPT

## 2022-07-21 PROCEDURE — 82607 VITAMIN B-12: CPT

## 2022-07-21 PROCEDURE — 85027 COMPLETE CBC AUTOMATED: CPT

## 2022-07-21 PROCEDURE — 36415 COLL VENOUS BLD VENIPUNCTURE: CPT

## 2022-07-21 PROCEDURE — 93010 ELECTROCARDIOGRAM REPORT: CPT | Performed by: INTERNAL MEDICINE

## 2022-07-21 PROCEDURE — 2060000000 HC ICU INTERMEDIATE R&B

## 2022-07-21 PROCEDURE — 82746 ASSAY OF FOLIC ACID SERUM: CPT

## 2022-07-21 PROCEDURE — 99232 SBSQ HOSP IP/OBS MODERATE 35: CPT | Performed by: SOCIAL WORKER

## 2022-07-21 PROCEDURE — 99232 SBSQ HOSP IP/OBS MODERATE 35: CPT | Performed by: INTERNAL MEDICINE

## 2022-07-21 PROCEDURE — 87086 URINE CULTURE/COLONY COUNT: CPT

## 2022-07-21 PROCEDURE — 72125 CT NECK SPINE W/O DYE: CPT

## 2022-07-21 PROCEDURE — 6370000000 HC RX 637 (ALT 250 FOR IP)

## 2022-07-21 PROCEDURE — 99223 1ST HOSP IP/OBS HIGH 75: CPT | Performed by: NEUROLOGICAL SURGERY

## 2022-07-21 PROCEDURE — 93005 ELECTROCARDIOGRAM TRACING: CPT

## 2022-07-21 PROCEDURE — 81001 URINALYSIS AUTO W/SCOPE: CPT

## 2022-07-21 PROCEDURE — 97116 GAIT TRAINING THERAPY: CPT

## 2022-07-21 PROCEDURE — 97162 PT EVAL MOD COMPLEX 30 MIN: CPT

## 2022-07-21 RX ADMIN — ASPIRIN 81 MG: 81 TABLET, COATED ORAL at 09:34

## 2022-07-21 RX ADMIN — ACETAMINOPHEN 325MG 650 MG: 325 TABLET ORAL at 20:26

## 2022-07-21 RX ADMIN — CLOPIDOGREL BISULFATE 75 MG: 75 TABLET ORAL at 09:34

## 2022-07-21 RX ADMIN — ATORVASTATIN CALCIUM 80 MG: 80 TABLET, FILM COATED ORAL at 09:34

## 2022-07-21 RX ADMIN — ACETAMINOPHEN 325MG 650 MG: 325 TABLET ORAL at 09:34

## 2022-07-21 RX ADMIN — ENOXAPARIN SODIUM 40 MG: 100 INJECTION SUBCUTANEOUS at 20:26

## 2022-07-21 ASSESSMENT — PAIN DESCRIPTION - DESCRIPTORS
DESCRIPTORS: ACHING
DESCRIPTORS: ACHING

## 2022-07-21 ASSESSMENT — ENCOUNTER SYMPTOMS
SORE THROAT: 0
NAUSEA: 0
VOMITING: 0
BACK PAIN: 1
CHEST TIGHTNESS: 0
SHORTNESS OF BREATH: 1
COUGH: 1
ABDOMINAL PAIN: 0
EYE PAIN: 1
RHINORRHEA: 1
CONSTIPATION: 1
SINUS PRESSURE: 1

## 2022-07-21 ASSESSMENT — PAIN SCALES - GENERAL
PAINLEVEL_OUTOF10: 0
PAINLEVEL_OUTOF10: 3
PAINLEVEL_OUTOF10: 1
PAINLEVEL_OUTOF10: 5

## 2022-07-21 ASSESSMENT — PAIN DESCRIPTION - PAIN TYPE: TYPE: ACUTE PAIN

## 2022-07-21 ASSESSMENT — PAIN DESCRIPTION - ORIENTATION
ORIENTATION: RIGHT
ORIENTATION: RIGHT

## 2022-07-21 ASSESSMENT — PAIN DESCRIPTION - FREQUENCY: FREQUENCY: CONTINUOUS

## 2022-07-21 ASSESSMENT — PAIN DESCRIPTION - LOCATION
LOCATION: HEAD
LOCATION: HEAD

## 2022-07-21 ASSESSMENT — PAIN DESCRIPTION - ONSET: ONSET: AWAKENED FROM SLEEP

## 2022-07-21 NOTE — PROGRESS NOTES
Stroke Folder given. What is Stroke/CVA  Signs and Symptoms of stroke (BEFAST)  Treatments for Stroke  Personal Risk Factors for Stroke discussed  Education--Call 911    Patient/family has been educated on their personal risk factors of:  Hypertension  Previous stroke  Hyperlipidemia  smoking cessation  Carotid Artery stenosis    They have been given hand outs on the following medications:    asa  Plavix  statins(Lipitor)  antihypertensive(Hydralazine, lisinopril)    Treatment for stroke includes:  Risk factor modifications  Following the medication regime prescribed by physician      Educated on FAST-Face-Arm-Speech-Time    A stroke is a brain attack. Stroke is a brain injury. It occurs when the brain's blood supply is interrupted. Blood carries oxygen and nutrients to the brain. Without oxygen and nutrients from blood, brain tissue starts to die rapidly. This can happen in less than 10 minutes. A stroke occurs when blood flow to the brain is blocked (called ischemic stroke). This is caused by one of the following:   Sudden decreased blood flow   Damage to a blood vessel supplying blood to the brain can occur suddenly from either:   Injury   A clot that forms and breaks off from another part of the body (such as the heart or neck)   There are certain conditions which predispose people to form blood clots, such as:   Cancer   Pregnancy   Atrial fibrillation   Certain autoimmune diseases   Local blood clot   A build-up of fatty substances ( atherosclerotic plaque ) along the inner lining of the artery causes:   Narrowing of artery   Reduced elasticity   Local inflammation   Blood protein defects leading to increased clotting tendency   Decreased blood flow in the artery   Clot in an artery supplying the brain   Inflammatory conditions in the blood vessels (vasculitis)   A stroke may also occur if a blood vessel breaks and bleeds into or around the brain. This is called hemorrhagic stroke.       This condition needs to be monitored closely. Be sure to keep all appointments. Have exams and blood tests done as directed. Call 911 If Any of the Following Occurs   It is important that you and those around you know the warning signs for stroke. CALL 911 immediately if you have any of the following which may suggest a new stroke:   Sudden weakness or numbness of face, arm, or leg, especially on one side of the body   Sudden confusion   Sudden trouble speaking or understanding   Sudden trouble seeing in one or both eyes   Sudden dizziness, trouble walking, loss of balance, or coordination   Sudden severe headache with no known cause   If you think you have an emergency, CALL 911       To help reduce your risk of stroke, take the following steps:   Eat a well-balanced diet. The DASH diet rich in fruits, vegetables and low-fat dairy foods, and low in saturated fat, total fat, and cholesterolmay help keep your blood pressure in the healthy range. Exercise regularly. Maintain a healthy weight. (Your body mass index should be below 25.)   If you smoke, quit . Drink alcohol in moderation. Moderate is two or fewer drinks per day for men and one or fewer drinks per day for women and older adults. Control your diabetes    All patient/family questions were answered and teach back method was utilized.
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
## 2022-07-21 NOTE — CARE COORDINATION
7/21/22, 8:26 AM EDT  DISCHARGE PLANNING EVALUATION:    Justin Esaon       Admitted: 7/20/2022/ [facility] day: 1   Location: [facility]19/019-A Reason for admit: Stroke-like symptoms [R29.90]   PMH:  has a past medical history of CAD (coronary artery disease), COPD (chronic obstructive pulmonary disease) ([address] Hyperlipidemia, and Hypertension. Procedure:   7/20 MRI Brain WO Contrast 1. Numerous foci of T2 hyperintensity within the white matter. This may be   on the basis of advanced small vessel ischemic change or a demyelinating   process. 2. Chronic lacunar infarct involving the right basal ganglia and right   thalamus. Small chronic infarct within the right cerebellar hemisphere. Flow voids   7/20 ECHO EF 55%  Barriers to Discharge:  From [facility]. Telemetry, PT/OT, Neurology consult, electrolyte replacement protocols, Asa, Lipitor, Plavix, Lovenox. PCP: QUEENIE Mario CNP  Readmission Risk Score: 7.5%  Patient's Healthcare Decision Maker: Patient Declined (Legal Next of Kin Remains as Decision Maker)    Patient Goals/Plan/Treatment Preferences: Met with Aaliyah Archibald. She currently lives at home with her boyfriend an grandson. Plan is to return home at discharge. Will follow clinical course for potential needs and or services. SW consult in place. PCP and insurance verified. Transportation/Food Security/Housekeeping Addressed:  No issues identified.
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
## 2022-07-21 NOTE — PLAN OF CARE
Problem: Discharge Planning  Goal: Discharge to home or other facility with appropriate resources  7/20/2022 2215 by Iwona Cornell RN  Outcome: Progressing  Flowsheets (Taken 7/20/2022 2215)  Discharge to home or other facility with appropriate resources: Identify barriers to discharge with patient and caregiver     Problem: Pain  Goal: Verbalizes/displays adequate comfort level or baseline comfort level  7/20/2022 2215 by Iwona Cornell RN  Flowsheets (Taken 7/20/2022 2215)  Verbalizes/displays adequate comfort level or baseline comfort level:   Encourage patient to monitor pain and request assistance   Assess pain using appropriate pain scale     Problem: Safety - Adult  Goal: Free from fall injury  7/20/2022 2215 by Iwona Cornell RN  Outcome: Progressing  Flowsheets (Taken 7/20/2022 2215)  Free From Fall Injury: Instruct family/caregiver on patient safety     Problem: Neurosensory - Adult  Goal: Achieves stable or improved neurological status  7/20/2022 2215 by Iwona Cornell RN  Outcome: Progressing  Flowsheets (Taken 7/20/2022 2215)  Achieves stable or improved neurological status: Assess for and report changes in neurological status     Problem: Skin/Tissue Integrity - Adult  Goal: Skin integrity remains intact  7/20/2022 2215 by Iwona Cornell RN  Outcome: Progressing  Flowsheets (Taken 7/20/2022 2215)  Skin Integrity Remains Intact: Monitor for areas of redness and/or skin breakdown     Problem: ABCDS Injury Assessment  Goal: Absence of physical injury  7/20/2022 2215 by Iwona Cornell RN  Outcome: Progressing  Flowsheets (Taken 7/20/2022 2215)  Absence of Physical Injury: Implement safety measures based on patient assessment     Problem: Skin/Tissue Integrity  Goal: Absence of new skin breakdown  Description: 1. Monitor for areas of redness and/or skin breakdown  2. Assess vascular access sites hourly  3. Every 4-6 hours minimum:  Change oxygen saturation probe site  4.   Every 4-6 hours:  If on nasal continuous positive airway pressure, respiratory therapy assess nares and determine need for appliance change or resting period. Outcome: Progressing  Note: No signs of skin breakdown. Skin warm, dry, and intact. Mucous membranes pink and moist.  Assistance with turns/ambulation provided PRN. Will continue to monitor. Care plan reviewed with patient. Patient verbalizes understanding of the plan of care and contributed to goal setting.

## 2022-07-21 NOTE — PROGRESS NOTES
[address] [facility],[address] NEUROSCIENCES 4A - 7N-90/124-S    Time In: 3835  Time Out: 7339  Timed Code Treatment Minutes: 23 Minutes  Minutes: 33          Date: 2022  Patient Name: Kurtis Velasco,  Gender:  female        MRN: [id number]  : 1965  (64 y.o.)      Referring Practitioner: Lalo Chavarria PA-C  Diagnosis: stroke-like symptoms  Additional Pertinent Hx: Per H&P : Rubi Chamberlain is a 65 y/o  female with a PMHx of CAD, COPD, HLD, HTN who presents to [facility] via direct admit today for the evaluation of numbness and tingling of face and intermittent right leg pain. Pt reports yesterday morning she woke up from sleep and noticed head pain that was located more on the right side of her face in the forehead and around her right eye, that occasionally would radiate to the back of the head. She denies vision changes, but states she also has associated right eye tearing. This morning she also experienced a self-limiting episode of confusion and disorientation, and significant generalized weakness for which she was unable to get out of bed without assistance. She also notes new right hip pain that is intermittent. The pain is severe, radiates down her right leg. Per Neurology pt presents with Acute sensory changes, bilateral lower extremity pain and weakness, incontinence in the setting of low back pain and hyperreflexia. CT head Negative for acute findings, age indeterminate infarct with anterior limb of right internal capsule. CTA neck Negative for large vessel occlusion, Chronic infarction right lentiform nucleus and anterior with enlargement of ventricular system     Restrictions/Precautions:  Restrictions/Precautions: Fall Risk, General Precautions    Subjective:  Chart Reviewed: Yes  Patient assessed for rehabilitation services?: Yes  Family / Caregiver Present: No  Subjective: RN approved session. Pt agrees for PT evaluation.  Pt denies concern regarding discharge home. Pt states she has had balance problems for a long time, states she does not carry her grandson outside for fear of falling with him. Pt agrees for continued PT upon d/c RN aware. Discussed slowing down, increasing attention to safety and task at hand, and having assistance for improved safety. Trialed a cane this session, pt agrees that this was not a good fit for her to improve her stablity secondary to dual task and increased attention to manage this. General:  Overall Orientation Status: Within Functional Limits (not formally assessed)  Vision: Impaired  Vision Exceptions: Wears glasses at all times  Hearing: Exceptions to [facility]  Hearing Exceptions: Hard of hearing/hearing concerns     Pain: 3-4/10: R side of her head is \"tender\"    Vitals: Heart Rate: 76 bpm, maintained WFL    Social/Functional History:    Lives With: Significant other (grandson)  Type of Home: Apartment  Home Layout: One level  Home Access: Stairs to enter with rails  Entrance Stairs - Number of Steps: 6  Entrance Stairs - Rails: Left  Home Equipment:  (no DME)          Receives Help From: Family        Ambulation Assistance: Independent  Transfer Assistance: Independent    Active : No  Occupation: Unemployed  Additional Comments: Pt's boyfriend works full time, pt has been caring for her 18 month old grandson. Pt states her granddaughter is planning to stay with her until school starts, she is 15. Pt indep with no AD PTA. Pt states her children can assist if needed    OBJECTIVE:  Range of Motion:  Bilateral Lower Extremity: WFL    Strength:  Bilateral Lower Extremity: WFL  4+/5 at all joints     Balance:  Static Sitting Balance:  Stand By Assistance  Dynamic Sitting Balance: Stand By Assistance  Static Standing Balance: Stand By Assistance, Contact Guard Assistance  Dynamic Standing Balance: Minimal Assistance  Min assist provided with the Tinetti. Neuromuscular Facilitation:   The following activities were completed of gait with use of cane with ambulation. No noticeable improvement in gait pattern with use of cane as AD. Discontinued further ambulation trial with cane because of this and pt agrees. Did provide education on risks of reaching for items in her environment for stability, encouraged her not to do this, but to only reach for sturdy objects such as a couch, counter, or kitchen table if she finds herself continuing to do this. Pt states understanding. Stairs:  Contact Guard Assistance, with verbal cues , with increased time for completion  Number of Steps: 6  Height: 6\" step with One Handrail  Pt completed with reciprocal pattern. CGA provided for safety. PT recommending supervision from her boyfriend with stairs initially for safety. Pt receptive to this. Exercise:none this session    Functional Outcome Measures: Completed  Balance Score: 11  Gait Score: 8  Tinetti Total Score: 19/28 with moderate fall risk  AM-PAC Inpatient Mobility without Stair Climbing Raw Score : 15  AM-PAC Inpatient without Stair Climbing T-Scale Score : 43.03    Risk Indicators:  Less than/equal to 18 = high risk  19-23 Moderate risk  Greater than/equal to 24 = low risk    ASSESSMENT:  Activity Tolerance:  Patient tolerance of  treatment: good. Pt tolerated mobility well, required cues and CGA with ambulation and stairs for safety. Pt did not tolerate use of cane with ambulation trial well, discontinued this and recommended slowing down and increasing attention to her environment, having increased assistance, and continuing PT to improve her stability with gait. Pt receptive to this. Pt educated on not ambulating without assist while in the hospital.       Treatment Initiated: Treatment and education initiated within context of evaluation.   Evaluation time included review of current medical information, gathering information related to past medical, social and functional history, completion of standardized testing, formal and informal observation of tasks, assessment of data and development of plan of care and goals. Treatment time included skilled education and facilitation of tasks to increase safety and independence with functional mobility for improved independence and quality of life. Assessment: Body Structures, Functions, Activity Limitations Requiring Skilled Therapeutic Intervention: Decreased functional mobility , Decreased endurance, Decreased safe awareness, Decreased strength, Decreased balance, Decreased posture  Assessment: This patient is a 64 y.o. who presents with stroke like symptoms. This is a decline from the patient's baseline status of indep with no AD, caring for her young grandson and living with her boyfriend. Pt scored a 19/28 indicating a moderate fall risk. The patient is observed to have deficits in strength, balance, activity tolerance, and safety awareness and would benefit from skilled PT services to progress functional mobility, safety awareness, and to decrease overall risk of falls. Pt would benefit from increased assist with ambulation and with stairs, continued PT, and increasing her attention to task and safety for improved safety with mobility. Therapy Prognosis: Good    Requires PT Follow-Up: Yes    Discharge Recommendations:  Discharge Recommendations: Continue to assess pending progress, Therapy recommended at discharge    Patient Education:      .     Patient Education  Education Given To: Patient  Education Provided: Role of Therapy, Plan of Care, Energy Conservation, Equipment, Fall Prevention Strategies, Transfer Training  Education Method: Demonstration, Verbal  Education Outcome: Continued education needed       Equipment Recommendations:  Equipment Needed: No    Plan:  Current Treatment Recommendations: Strengthening, Balance training, Functional mobility training, Transfer training, Stair training, Gait training, Endurance training, Neuromuscular re-education, Home exercise program, Safety education & training, Patient/Caregiver education & training, Equipment evaluation, education, & procurement, Therapeutic activities  Plan:  (5x GM/N)    Goals:  Patient goals : \"I want to quit smoking\"  Short Term Goals  Time Frame for Short term goals: by hospital d/c  Short term goal 1: Pt to demo supine <->sit mod I for ease with getting in and out of bed  Short term goal 2: Pt to complete sit <->stand indep for ease with transfers between surfaces  Short term goal 3: Pt to ambulate >=80' with S and no AD for improved safey in her enviornment  Short term goal 4: Pt to ascend/descend 6 steps with uni HR with SBA for home entry  Short term goal 5: Pt to improve her Tinetti score to >=24/28 to progress to the low fall risk category  Long Term Goals  Time Frame for Long term goals : NA due to short ELOS    Following session, patient left in safe position with all fall risk precautions in place.

## 2022-07-21 NOTE — CONSULTS
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  -However, patient cervical spine findings cannot explain all of patient neurological current symptoms. PLAN:    -Medical management per patient primary.  -Patient can be discharged from neurosurgical perspective once she is cleared with other medical services.   -Follow-up with neurosurgery outpatient clinic after 2 to 3 weeks for reevaluation and to update her recommendation and treatment from neurosurgical perspective.  -I discussed the case in detail with patient and with her nurse. HISTORY OF PRESENT ILLNESS:  Josh Garg is a 64 y.o. female, admitted on :7/20/2022 12:09 PM    This is  a 64year old female with a PMHx of CAD, COPD, HLD, HTN who presented to [facility] as a  direct admit  for the evaluation of numbness and tingling of face and intermittent right leg pain. Pt reported that prior to her admission she woke up from sleep she started to experience head pain that was located more on the right side of her face in the forehead and around her right eye mainly. She reported also episodes of a self-limiting episode of confusion and disorientation, and significant generalized weakness for which she was unable to get out of bed without assistance. Patient has a previous surgery of cervical spine anterior decompression and fusion. Patient was evaluated initially by neurology stroke team.  Patient underwent cervical spine MRI as part of her work-up which showed: Showed findings consistent with adjacent segment. ROS:    Review of Systems   Constitutional:  Negative for fever.    HENT:  Negative for congestion. Eyes:  Negative for visual disturbance. Respiratory:  Negative for chest tightness. Cardiovascular:  Negative for chest pain. Gastrointestinal:  Negative for abdominal pain. Genitourinary:  Negative for difficulty urinating. Musculoskeletal:  Positive for neck pain. Neurological:  Positive for weakness, numbness and headaches. Psychiatric/Behavioral:  Negative for confusion. PROBLEM LIST:  Patient Active Problem List   Diagnosis    Stroke-like symptoms       MEDICATIONS:   Prior to Admission medications    Medication Sig Start Date End Date Taking? Authorizing Provider   atorvastatin (LIPITOR) 80 MG tablet Take 80 mg by mouth in the morning. Yes Historical Provider, MD   lisinopril (PRINIVIL;ZESTRIL) 40 MG tablet Take 40 mg by mouth in the morning. Yes Historical Provider, MD   clopidogrel (PLAVIX) 75 MG tablet Take 75 mg by mouth in the morning. Yes Historical Provider, MD   spironolactone (ALDACTONE) 50 MG tablet Take 50 mg by mouth in the morning. Yes Historical Provider, MD   amLODIPine (NORVASC) 5 MG tablet Take 10 mg by mouth in the morning and 10 mg in the evening. Yes Historical Provider, MD   nitroGLYCERIN (NITROSTAT) 0.3 MG SL tablet Place 0.3 mg under the tongue every 5 minutes as needed for Chest pain up to max of 3 total doses. If no relief after 1 dose, call 911. Yes Historical Provider, MD   hydrALAZINE (APRESOLINE) 50 MG tablet Take 50 mg by mouth in the morning and 50 mg at noon and 50 mg before bedtime. Yes Historical Provider, MD   carvedilol (COREG) 25 MG tablet Take 25 mg by mouth in the morning and 25 mg in the evening. Take with meals.    Yes Historical Provider, MD       Current Facility-Administered Medications   Medication Dose Route Frequency Provider Last Rate Last Admin    ondansetron (ZOFRAN-ODT) disintegrating tablet 4 mg  4 mg Oral Q8H PRN Ana Rosa Rooney PA-C        Or    ondansetron (ZOFRAN) injection 4 mg  4 mg IntraVENous Q6H PRN Mc Valdes PA-C        polyethylene glycol (GLYCOLAX) packet 17 g  17 g Oral Daily PRN Ana Rosa Rooney PA-C        enoxaparin (LOVENOX) injection 40 mg  40 mg SubCUTAneous Q24H Ana Rosa Rooney PA-C   40 mg at 07/20/22 2141    aspirin EC tablet 81 mg  81 mg Oral Daily Ana Rosa Rooney PA-C   81 mg at 07/21/22 3629    Or    aspirin suppository 300 mg  300 mg Rectal Daily Ana Rosa Rooney PA-C        [Held by provider] amLODIPine (NORVASC) tablet 10 mg  10 mg Oral BID Ana Rosa Rooney PA-C        atorvastatin (LIPITOR) tablet 80 mg  80 mg Oral Daily Ana Rosa Rooney PA-C   80 mg at 07/21/22 0934    [Held by provider] carvedilol (COREG) tablet 25 mg  25 mg Oral BID WC Ana Rosa Rooney PA-C   25 mg at 07/20/22 1645    clopidogrel (PLAVIX) tablet 75 mg  75 mg Oral Daily Ana Rosa Rooney PA-C   75 mg at 07/21/22 0934    [Held by provider] lisinopril (PRINIVIL;ZESTRIL) tablet 40 mg  40 mg Oral Daily Ana Rosa Rooney PA-C        [Held by provider] spironolactone (ALDACTONE) tablet 50 mg  50 mg Oral Daily Ana Rosa Rooney PA-C        potassium chloride (KLOR-CON M) extended release tablet 40 mEq  40 mEq Oral PRN Ana Rosa Rooney PA-C        Or    potassium bicarb-citric acid (EFFER-K) effervescent tablet 40 mEq  40 mEq Oral PRN Ana Rosa Rooney PA-C        Or    potassium chloride 10 mEq/100 mL IVPB (Peripheral Line)  10 mEq IntraVENous PRN Ana Rosa Rooney PA-C        magnesium sulfate 2000 mg in 50 mL IVPB premix  2,000 mg IntraVENous PRN Ana Rosa Rooney PA-C        acetaminophen (TYLENOL) tablet 650 mg  650 mg Oral Q4H PRN Ana Rosa Rooney PA-C   650 mg at 07/21/22 0934        ondansetron, 4 mg, Q8H PRN   Or  ondansetron, 4 mg, Q6H PRN  polyethylene glycol, 17 g, Daily PRN  potassium chloride, 40 mEq, PRN   Or  potassium alternative oral replacement, 40 mEq, PRN   Or  potassium chloride, 10 mEq, PRN  magnesium sulfate, 2,000 mg, PRN  acetaminophen, 650 mg, Q4H PRN          ALLERGIES:   Patient has no known allergies. PAST MEDICAL  HISTORY:    has a past medical history of CAD (coronary artery disease), COPD (chronic obstructive pulmonary disease) ([address] Hyperlipidemia, and Hypertension. PAST SURGICAL  HISTORY:    has a past surgical history that includes Cardiac surgery and Breast surgery. SOCIAL HISTORY:   Social History     Tobacco Use    Smoking status: Every Day     Packs/day: 0.50     Years: 26.00     Pack years: 13.00     Types: Cigarettes    Smokeless tobacco: Never   Substance Use Topics    Alcohol use: Never       FAMILY HISTORY:  History reviewed. No pertinent family history. LABS  CBC:   Lab Results   Component Value Date/Time    WBC 8.7 07/21/2022 04:18 AM    RBC 4.62 07/21/2022 04:18 AM    HGB 14.1 07/21/2022 04:18 AM    HCT 43.6 07/21/2022 04:18 AM    MCV 94.4 07/21/2022 04:18 AM    MCH 30.5 07/21/2022 04:18 AM    MCHC 32.3 07/21/2022 04:18 AM     07/21/2022 04:18 AM    MPV 10.3 07/21/2022 04:18 AM     CMP:    Lab Results   Component Value Date/Time     07/21/2022 04:18 AM    K 3.8 07/21/2022 04:18 AM     07/21/2022 04:18 AM    CO2 27 07/21/2022 04:18 AM    BUN 13 07/21/2022 04:18 AM    CREATININE 0.6 07/21/2022 04:18 AM    LABGLOM >90 07/21/2022 04:18 AM    GLUCOSE 114 07/21/2022 04:18 AM    CALCIUM 8.4 07/21/2022 04:18 AM     PT/INR:  No results found for: PROTIME, INR  PTT:  No results found for: APTT, PTT[APTT}    RADIOLOGY:  Pertinent images have been reviewed. L-spine MRI showed:    1. Possible chronic pars defects at L5. No associated alignment    abnormality. 2. Tiny central disc protrusions at L4-L5 and L5-S1 with mild thecal sac    effacement. 3. Additional chronic findings as above. Cervical spine MRI showed:    1. Status post anterior metallic and interbody fusion at C5-C6. Unremarkable postoperative appearance.    2. Grade 1 anterolisthesis and 4 mm central disc protrusion at C3-C4 with    moderate mass-effect on the thecal sac and mild mass-effect on the    anterior aspect of the cervical spinal cord. 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 EXAMINATION:  Patient awake alert and oriented x3  GCS: 15/15   Pupils: equal and reactive   FCx4 with good strength through out   Sensory: grossly intact  Reflexes: 3+ through out.   Planter reflex: Down response       *I spend a total of  50 minutes with greater than 60% of time spent face to face, counseling, coordinating care, examining patient, reviewing images and labs personally, and speaking with team.      Jorge Cavanaugh MD,MD  Electronically signed 7/21/2022

## 2022-07-21 NOTE — PROGRESS NOTES
Progress Note    Patient:  Sy Harmon    Unit/Bed:4A-19/019-A  YOB: 1965  MRN: [id number]   Acct: [de-identified]   Admit date: 7/20/2022      Principal Problem:    Stroke-like symptoms  Resolved Problems:    * No resolved hospital problems. *    Disposition follow-up on neurosurgical consult      Assessment and Plan:  Atypical right-sided facial headache strongly suspect an atypical migraine patient has had a prior history of this in the past  Right lower extremity right upper extremity nonspecific pain likely chronic and neuropathic in nature  Malignant hypertension continue to monitor as tolerated done so we will likely resume ACE inhibitor amlodipine and hydralazine  Coronary disease no acute ischemic changes on EKG appears to be left bundle branch block and noted on prior EKG in October 2021 no chest pain no dyspnea on exertion  Chronic struct of pulmonary disease, no wheezing no active treatment required at this time patient is tolerating room air. Chronic cervical canal stenosis        Patient Seen, Chart, Consults notes, Labs, Radiology studies reviewed. Subjective: Day 1 of stay with hospital service  Patient is clinically well MRI of the brain has been performed and seems to show changes consistent with chronic small vessel disease, there are also chronic lacunar infarcts in the right basal ganglia and right thalamus but no kamryn focal deficits are noted  The patient on imaging did have prior C5-C6 fusion and C3-C4 anterolisthesis  Secondary to nonspecific cervical findings neurology has requested neurosurgery consult although patient appears to be ambulatory  Patient underwent an echo that shows an intact EF there are some aortic stenosis  Urinalysis has been performed does not seem to show acute infection    All other ROS negative except noted in HPI    Past, Family, Social History unchanged from admission. Diet:  ADULT DIET;  Regular    Medications:  Scheduled Meds:   enoxaparin  40 mg SubCUTAneous Q24H    aspirin  81 mg Oral Daily    Or    aspirin  300 mg Rectal Daily    [Held by provider] amLODIPine  10 mg Oral BID    atorvastatin  80 mg Oral Daily    [Held by provider] carvedilol  25 mg Oral BID WC    clopidogrel  75 mg Oral Daily    [Held by provider] lisinopril  40 mg Oral Daily    [Held by provider] spironolactone  50 mg Oral Daily     Continuous Infusions:  PRN Meds:ondansetron **OR** ondansetron, polyethylene glycol, potassium chloride **OR** potassium alternative oral replacement **OR** potassium chloride, magnesium sulfate, acetaminophen    Objective:  CBC:   Recent Labs     07/20/22  1331 07/21/22  0418   WBC 10.5 8.7   HGB 14.1 14.1    313     BMP:    Recent Labs     07/20/22  1331 07/21/22  0418    144   K 4.6 3.8    108   CO2 27 27   BUN 17 13   CREATININE 0.8 0.6   GLUCOSE 99 114*     Calcium:  Recent Labs     07/21/22  0418   CALCIUM 8.4*     Ionized Calcium:No results for input(s): IONCA in the last 72 hours. Magnesium:No results for input(s): MG in the last 72 hours. Phosphorus:No results for input(s): PHOS in the last 72 hours. BNP:No results for input(s): BNP in the last 72 hours. Glucose:No results for input(s): POCGLU in the last 72 hours. HgbA1C:   Recent Labs     07/20/22  1331   LABA1C 5.9     INR: No results for input(s): INR in the last 72 hours. Hepatic: No results for input(s): ALKPHOS, ALT, AST, PROT, BILITOT, BILIDIR, LABALBU in the last 72 hours. Amylase and Lipase:No results for input(s): LACTA, AMYLASE in the last 72 hours. Lactic Acid: No results for input(s): LACTA in the last 72 hours. Troponin: No results for input(s): CKTOTAL, CKMB, TROPONINT in the last 72 hours. BNP: No results for input(s): BNP in the last 72 hours.   Lipids:   Recent Labs     07/20/22  1331   CHOL 119   TRIG 143   HDL 35   LDLCALC 55     ABGs: No results found for: PH, PCO2, PO2, HCO3, O2SAT        Radiology reports as per the Radiologist  Radiology: ECHO Complete 2D W Doppler W Color    Result Date: 7/20/2022  Transthoracic Echocardiography Report (TTE)  Demographics   Patient Name     Favio Medina   Gender                Female   MR #             [id number]   Race                                                  Ethnicity   Account #        [de-identified]   Room Number           4821   Accession Number [id number]  Date of Study         07/20/2022   Date of Birth    1965  Referring Physician   Sharmila Chiu PA-C   Age              64 year(s)  [facility], [facility]                                Interpreting          Ketan Wilson MD                               Physician             Echo reader of the week  Procedure Type of Study   TTE procedure:ECHOCARDIOGRAM COMPLETE 2D W DOPPLER W COLOR. Procedure Date Date: 07/20/2022 Start: 03:47 PM Study Location: Bedside Technical Quality: Adequate visualization Indications:Possible stroke. Additional Medical History:Hypertension, Hyperlipidemia, COPD, Smoker, Coronary artery disease. Patient Status: Routine Contrast Medium: Bubble Study. Height: 63 inches Weight: 148 pounds BSA: 1.7 m^2 BMI: 26.22 kg/m^2 BP: 105/79 mmHg  Conclusions   Summary  Ejection fraction is visually estimated at 55%. Overall left ventricular function is normal.  Moderate concentric left ventricular hypertrophy. Septal wall asymmetrical left ventricular hypertrophy. Mildly dilated left atrium. Aortic valve appears possibly bicuspid. Aortic valve leaflets are somewhat thickened. cant exclude some degree of stenosis which is difficult to quantitate due  to elevated LVOT velocity with LVOT obstruction that is significant with  mean gradient 44 mm hg  Mildly dilated left atrium.   bubble contrast study with no shunting   Signature   ----------------------------------------------------------------  Electronically signed by Jeramy Parada MD (Interpreting  physician) on 07/20/2022 at 06:57 PM  ----------------------------------------------------------------   Findings   Mitral Valve  Trace mitral regurgitation is present. Aortic Valve  Aortic valve appears possibly bicuspid. Aortic valve leaflets are somewhat thickened. cant exclude some degree of stenosis which is difficult to quantitate due  to elevated LVOT velocity with LVOT obstruction that is significant with  mean gradient 44 mm hg   Tricuspid Valve  Trivial tricuspid regurgitation visualized. Pulmonic Valve  Trivial pulmonic regurgitation visualized. The pulmonic valve was not well visualized . Left Atrium  Mildly dilated left atrium. bubble contrast study with no shunting   Left Ventricle  Ejection fraction is visually estimated at 55%. Overall left ventricular function is normal.  Moderate concentric left ventricular hypertrophy. Septal wall asymmetrical left ventricular hypertrophy. Right Atrium  Right atrial size was normal.   Right Ventricle  The right ventricular size was normal with normal systolic function and  wall thickness. Pericardial Effusion  The pericardium was normal in appearance with no evidence of a pericardial  effusion. Pleural Effusion  No evidence of pleural effusion. Aorta / Great Vessels  -Aortic root dimension within normal limits.  -The Pulmonary artery is within normal limits. -IVC size is within normal limits with normal respiratory phasic changes.   M-Mode/2D Measurements & Calculations   LV Diastolic    LV Systolic Dimension: 2.6  AV Cusp Separation: 1.8 cmAO  Dimension: 4 cm cm                          Root Dimension: 3.4 cmLA Area:  LV FS:35 %      LV Volume Diastolic: 70 ml  53.5 cm^2  LV PW           LV Volume Systolic: 58.9 ml  Diastolic: 1.8  LV EDV/LV EDV Index: 70  cm              ml/41 m^2LV ESV/LV ESV  Septum          Index: 24.6 ml/14 m^2       RV Diastolic Dimension: 2.6 cm  Diastolic: 2 cm EF Calculated: 64.9 %                                              Ascending Aorta: 3.3 cm                                              LA volume/Index: 62.4 ml                                              /37m^2                  LVOT: 2 cm  Doppler Measurements & Calculations   MV Peak E-Wave: 102 cm/s   AV Peak Velocity: 297  MV Peak A-Wave: 112 cm/s   cm/s  MV E/A Ratio: 0.91         AV Peak Gradient:      TV Peak E-Wave: 63.4  MV Peak Gradient: 4.16     35.28 mmHg             cm/s  mmHg                       AV Mean Velocity: 198  TV Peak A-Wave: 41.6                             cm/s                   cm/s  MV Deceleration Time: 407  AV Mean Gradient: 18  msec                       mmHg                   TV Peak Gradient: 1.61                             AV VTI: 66.4 cm        mmHg                                                    TR Velocity:277 cm/s  MV E' Septal Velocity: 3.7                        TR Gradient:30.69 mmHg  cm/s                                              PV Peak Velocity: 59.6  MV A' Septal Velocity: 7.1 IVRT: 82 msec          cm/s  cm/s                                              PV Peak Gradient: 1.42  MV E' Lateral Velocity:                           mmHg  6.9 cm/s  MV A' Lateral Velocity:  9.7 cm/s  E/E' septal: 27.57  E/E' lateral: 14.78  MR Velocity: 530 cm/s  [email or web address] [id number]    CT CERVICAL SPINE WO CONTRAST    Result Date: 7/21/2022  PROCEDURE: CT CERVICAL SPINE WO CONTRAST CLINICAL INFORMATION: rule out significant adjacent level disease. Neck pain for 2 weeks. COMPARISON: MRI cervical spine dated 7/20/2022. TECHNIQUE: 3 mm noncontrast axial images were obtained through the cervical spine with sagittal and coronal reconstructions.  All CT scans at this facility use dose modulation, iterative reconstruction, and/or weight-based dosing when appropriate to reduce electronically signed by Dr. Ana Haro MD on 7/21/2022 4:18 PM    MRI CERVICAL SPINE WO CONTRAST    Result Date: 7/20/2022  MR cervical spine without gadolinium Comparison: None Findings: Visualized intracranial contents are unremarkable. No cervical fluid collections or masses. Cervical cord normal. Status post anterior metallic and interbody fusion at C5-C6. Appropriate alignment at the surgical level. No evidence of hardware failure. Grade 1 anterolisthesis of C3 on C4. Remaining cervical alignment unremarkable. C2-C3: Partial disc desiccation. No disc bulge or herniation. No central canal or neural foraminal narrowing. C3-C4: Grade 1 anterolisthesis. 4 mm central disc protrusion with moderate mass-effect on the thecal sac and mild mass-effect on the anterior aspect of the cord. Moderate osteoarthritis of the left facet joint. Moderate narrowing of the left neural foramen. No significant narrowing of the right neural foramen. C4-C5: Small disc osteophyte complex with moderate thecal sac effacement. No significant neural foraminal narrowing. C5-C6: Near complete ankylosis of the vertebral bodies. Bony proliferation at the posterior margin of the disc with mild thecal sac effacement. No significant neural foraminal narrowing. C6-C7: Disc osteophyte complex. No significant central canal narrowing. Mild stenosis of bilateral neural foramina. C7-T1: Normal     1. Status post anterior metallic and interbody fusion at C5-C6. Unremarkable postoperative appearance. 2. Grade 1 anterolisthesis and 4 mm central disc protrusion at C3-C4 with moderate mass-effect on the thecal sac and mild mass-effect on the anterior aspect of the cervical spinal cord. There is also moderate osteoarthritis of the left facet joint contributing to moderate stenosis of the left neural foramen at this level.  This document has been electronically signed by: Beth Hooper MD on 07/20/2022 10:38 PM    MRI [address] [facility] CONTRAST    Result Date: 7/20/2022  MR thoracic spine without gadolinium Comparison: None Findings: Normal alignment. No acute fracture or pathologic bone lesion. Hemangioma incidentally noted within the T3 and T11 vertebral bodies. Thoracic cord normal size and signal. No significant spinal canal or foraminal stenoses. No significant degenerative change. Paraspinous musculature intact. Findings at the level of the cervical and lumbar spine are reported separately. No significant abnormality of the thoracic spine. This document has been electronically signed by: Kin Fisher MD on 07/20/2022 10:29 PM    MRI LUMBAR SPINE WO CONTRAST    Result Date: 7/20/2022  MR lumbar spine without gadolinium Comparison: None Findings: No scoliosis or spondylolisthesis. No acute fracture or pathologic bone lesion. Possible chronic pars defects at L5. Small hemangiomas incidentally noted. The conus is at the T12 level. Paraspinous musculature intact. L1-L2: Mild broad-based disc bulge with minimal thecal sac effacement. No significant neural foraminal narrowing. L2-L3: Minimal broad-based disc bulge. No significant central canal or neural foraminal narrowing. L3-L4: Mild broad-based disc bulge with minimal thecal sac effacement. Mild facet osteoarthritis. No significant neural foraminal narrowing. L4-L5: 3 mm central disc protrusion with mild thecal sac effacement. Mild facet osteoarthritis. Mild bilateral neural foraminal narrowing. L5-S1: 2 mm central disc protrusion with mild thecal sac effacement. Mild facet osteoarthritis. Mild bilateral neural foraminal narrowing. 1. Possible chronic pars defects at L5. No associated alignment abnormality. 2. Tiny central disc protrusions at L4-L5 and L5-S1 with mild thecal sac effacement. 3. Additional chronic findings as above.  This document has been electronically signed by: Kin Fisher MD on 07/20/2022 10:46 PM    US THYROID    Result Date: 7/21/2022  PROCEDURE: US THYROID CLINICAL INFORMATION: Hypodense mass involving left lobe of thyroid noted on CTA neck today COMPARISON: CT neck 7/19/2022 TECHNIQUE: Grayscale and color sonographic imaging of the thyroid gland performed in longitudinal and transverse planes. FINDINGS: THYROID SIZE: The right thyroid lobe measures 4.7 x 2 x 1.4 cm. The left thyroid lobe measures 4.9 x 2 x 1.8 cm. The isthmus measures 4 mm ECHOTEXTURE: Relatively inhomogeneous. RIGHT LOBE NODULES: 1. A 1.4 x 0.8 x 1.1 cm isoechoic nodule is seen in the mid right thyroid lobe. Margin: Smooth; Taller than wide: Yes ; Echogenic foci: None. Total points: 6. ACR category: TR 4. LEFT LOBE NODULES: 1. A 1.4 x 1.2 x 1.5 cm isoechoic nodule is seen in the mid left thyroid lobe Margin: Ill-defined; Taller than wide: Yes ; Echogenic foci: None. Total points: 6. ACR category: TR 4. 2. A 1.9 x 2.6 x 1.4 cm isoechoic nodule is seen in the inferior left thyroid lobe. Margin: Smooth; Taller than wide: No ; Echogenic foci: None. Total points: 3. ACR category: TR 3. ISTHMUS NODULES: None CERVICAL LYMPHADENOPATHY: 1. There are no pathologically enlarged lymph nodes adjacent to the thyroid gland. 1. The 2.6 cm isoechoic nodule in the inferior left thyroid lobe is a TR 3 nodule. Ultrasound-guided fine-needle aspiration is recommended. 2. Other thyroid nodules as described above. ACR TI-RADS Category and recommendations TR 1: 0 point. Benign. No FNA TR 2: 1,2 points-Not suspicious. No FNA TR 3: 3 points -Mildly suspicion. FNA is recommended for lesions greater than 2.5 cm; follow up if the nodule is greater than or equal to 1.5 cm. Follow-up can be done at 1, 3 and 5 years. Continued follow-up after 5 years can be obtained if there is no stability in size. TR 4: 4-6 points: Moderately suspicion. FNA greater than or equal to 1.5 cm; follow-up if the nodule is greater than or equal to 1 cm- follow-up can be done at 1, 2, 3 and 5 years.  Continued follow-up after 5 years can be obtained if there is no stability in size. TR 5: 7+points -Highly Suspicious. FNA greater than 1 cm, follow-up if greater than or equal to 0.5 cm **This report has been created using voice recognition software. It may contain minor errors which are inherent in voice recognition technology. ** Final report electronically signed by Dr Ashvin Cordon on 7/21/2022 8:53 AM    CT COMPARISON OF OUTSIDE FILMS    Result Date: 7/20/2022  Radiology exam is complete. No Radiologist dictation. Please follow up with ordering provider. CT COMPARISON OF OUTSIDE FILMS    Result Date: 7/20/2022  Radiology exam is complete. No Radiologist dictation. Please follow up with ordering provider. MRI BRAIN WO CONTRAST    Result Date: 7/20/2022  MR Brain without gadolinium. Comparison: None Findings: No restricted diffusion. No intracranial mass or hemorrhage. 1.4 cm chronic lacunar type infarct involving the right thalamus and right basal ganglia. Small focus of chronic infarction within the right cerebellar hemisphere. Involutional change of brain parenchyma, compatible with age. Numerous scattered foci of T2 hyperintensity within the white matter with involvement of both the superficial and deep white matter No midline shift. No hydrocephalus. Vascular flow voids are intact. The orbits are normal. The sinuses and mastoid air cells are clear. No focal bone lesion. 1. Numerous foci of T2 hyperintensity within the white matter. This may be on the basis of advanced small vessel ischemic change or a demyelinating process. 2. Chronic lacunar infarct involving the right basal ganglia and right thalamus. Small chronic infarct within the right cerebellar hemisphere. Flow voids This document has been electronically signed by: Patricia Huber MD on 07/20/2022 10:41 PM    XR COMPARISON OF OUTSIDE FILMS    Result Date: 7/20/2022  Radiology exam is complete. No Radiologist dictation. Please follow up with ordering provider.          Physical Exam:  Vitals: BP (!) 180/104   Pulse 68   Temp 97.5 °F (36.4 °C) (Oral)   Resp 18   Ht 5' 3\" (1.6 m)   Wt 148 lb (67.1 kg)   SpO2 96%   BMI 26.22 kg/m²   24 hour intake/output:  Intake/Output Summary (Last 24 hours) at 7/21/2022 1728  Last data filed at 7/21/2022 1318  Gross per 24 hour   Intake 1000 ml   Output --   Net 1000 ml     Last 3 weights: Wt Readings from Last 3 Encounters:   07/20/22 148 lb (67.1 kg)       General appearance - alert, awake appears to be in no acute distress  HEENT: Atraumatic normocephalic, no JVD. Trachea midline.    Chest - Bilateral air entry, no wheezes, crackles or rhonchi  Cardiovascular - S1S2 RRR, no murmurs or gallops  Abdomen - Soft non tender non distended, normoactive bowel sounds   Neurological - non focal, no neurological deficits noted  Integumentary - Skin color, texture, turgor normal. No Rashes or lesions  Musculoskeletal -Full ROM, No clubbing or cyanosis  Extremities: No peripheral edema    DVT prophylaxis: [x] Lovenox                                 [] SCDs                                 [] SQ Heparin                                 [] Encourage ambulation           [] Already on Anticoagulation               Electronically signed by Columba Neri MD on 7/21/2022 at 5:28 PM    Rounding Hospitalist

## 2022-07-21 NOTE — PROGRESS NOTES
Neurology Progress Note    Date:7/21/2022       XOUZ:[id number]  Patient Name:Henna Payton     YOB: 1965     Age:56 y.o. Requesting Physician: Guzman Lucio MD     Reason for Consult:  Evaluate for stroke-like symptoms    Chief Complaint: Numbness and tingling of right side of face, headache, bilateral lower extremity pain and weakness    Subjective     Isaac Jerry is a 64 y.o. female with a history of CAD s/p stent placement x5, COPD, HLD, HTN, MI who presents to [facility] as a direct admit from [facility] for the evaluation of paresthesias of right side of face and intermittent bilateral leg pain and weakness. Patient reports symptoms starting yesterday morning when she woke up with a right-sided headache with radiation occipitally, right-sided neck pain, intermittent bilateral visual changes, disorientation, dizziness, increased confusion, chest pain, slurred and garbled speech and generalized weakness. She denies any known history of stroke, but reports family history of stroke: mother, father, brother (age 39). Patient admits to extensive smoking history since she was 15years old. She currently smokes half a pack a day, but used to be a 2 pack/day smoker. Associated symptoms include low back pain with radicular symptoms, incontinence of bladder. Also admits to dysuria, flank pain, increased frequency of urination x1 week. Reports resolution of confusion disorientation, CT head without contrast at outside facility negative for acute findings, age-indeterminate infarct within anterior limb of right internal capsule.  CTA of the neck showing scattered calcified plaques without flow-limiting stenosis of anterior circulation, patent vertebral arteries, subtle atherosclerotic irregularity involving mid and distal PICA distribution on the left without large vessel occlusion, Low density in the lentiform nucleus and internal capsule on right with adjacent compensatory enlargement of ventricular system consistent with an area of chronic infarction. She was loaded with aspirin 324 mg yesterday. Currently on a baby aspirin, Lipitor 80 mg and Plavix 75 mg and reports compliance with these medications prior to presentation to [facility]. Interval History 7/21/22:   Pt reports that she is feeling better in terms of her sensory change. No new complaints today. Review of Systems   Review of Systems   Constitutional:  Positive for chills, diaphoresis and fatigue. Negative for fever. HENT:  Positive for rhinorrhea and sinus pressure. Negative for sore throat. Eyes:  Positive for pain and visual disturbance. Respiratory:  Positive for cough and shortness of breath. Cardiovascular:  Negative for palpitations. Gastrointestinal:  Positive for constipation. Negative for abdominal pain, nausea and vomiting. Genitourinary:  Positive for dysuria, flank pain and frequency. Musculoskeletal:  Positive for back pain, myalgias and neck pain. Negative for arthralgias. Skin:  Negative for rash. Neurological:  Positive for dizziness, weakness, numbness and headaches. Psychiatric/Behavioral:  Positive for confusion. The patient is not nervous/anxious. Medications   Scheduled Meds:    enoxaparin  40 mg SubCUTAneous Q24H    aspirin  81 mg Oral Daily    Or    aspirin  300 mg Rectal Daily    [Held by provider] amLODIPine  10 mg Oral BID    atorvastatin  80 mg Oral Daily    [Held by provider] carvedilol  25 mg Oral BID WC    clopidogrel  75 mg Oral Daily    [Held by provider] lisinopril  40 mg Oral Daily    [Held by provider] spironolactone  50 mg Oral Daily     Continuous Infusions:   PRN Meds: ondansetron **OR** ondansetron, polyethylene glycol, potassium chloride **OR** potassium alternative oral replacement **OR** potassium chloride, magnesium sulfate, acetaminophen  Medications Prior to Admission:   No current facility-administered medications on file prior to encounter.      Current Outpatient Medications on File Prior to Encounter   Medication Sig Dispense Refill    atorvastatin (LIPITOR) 80 MG tablet Take 80 mg by mouth in the morning. lisinopril (PRINIVIL;ZESTRIL) 40 MG tablet Take 40 mg by mouth in the morning. clopidogrel (PLAVIX) 75 MG tablet Take 75 mg by mouth in the morning. spironolactone (ALDACTONE) 50 MG tablet Take 50 mg by mouth in the morning. amLODIPine (NORVASC) 5 MG tablet Take 10 mg by mouth in the morning and 10 mg in the evening. nitroGLYCERIN (NITROSTAT) 0.3 MG SL tablet Place 0.3 mg under the tongue every 5 minutes as needed for Chest pain up to max of 3 total doses. If no relief after 1 dose, call 911.      hydrALAZINE (APRESOLINE) 50 MG tablet Take 50 mg by mouth in the morning and 50 mg at noon and 50 mg before bedtime. carvedilol (COREG) 25 MG tablet Take 25 mg by mouth in the morning and 25 mg in the evening. Take with meals. Past History    Past Medical History:   has a past medical history of CAD (coronary artery disease), COPD (chronic obstructive pulmonary disease) ([facility] [address] Hyperlipidemia, and Hypertension. Social History:   reports that she has been smoking cigarettes. She has a 13.00 pack-year smoking history. She has never used smokeless tobacco. She reports that she does not drink alcohol and does not use drugs. Family History: History reviewed. No pertinent family history. Physical Examination      Vitals:  /82   Pulse 72   Temp 97.7 °F (36.5 °C) (Oral)   Resp 14   Ht 5' 3\" (1.6 m)   Wt 148 lb (67.1 kg)   SpO2 97%   BMI 26.22 kg/m²   Temp (24hrs), Av.7 °F (36.5 °C), Min:97.6 °F (36.4 °C), Max:97.7 °F (36.5 °C)      I/O (24Hr): Intake/Output Summary (Last 24 hours) at 2022 0658  Last data filed at 2022 2337  Gross per 24 hour   Intake 520 ml   Output --   Net 520 ml           Physical Exam  Vitals reviewed. Constitutional:       General: She is not in acute distress.      Appearance: Normal appearance. She is not ill-appearing. HENT:      Head: Normocephalic and atraumatic. Right Ear: External ear normal.      Left Ear: External ear normal.      Nose: Nose normal.      Mouth/Throat:      Mouth: Mucous membranes are moist.      Pharynx: No oropharyngeal exudate or posterior oropharyngeal erythema. Eyes:      Extraocular Movements: Extraocular movements intact and EOM normal.      Pupils: Pupils are equal, round, and reactive to light. Pulmonary:      Effort: Pulmonary effort is normal. No respiratory distress. Musculoskeletal:         General: No tenderness. Normal range of motion. Cervical back: Normal range of motion. Right lower leg: No edema. Left lower leg: No edema. Skin:     General: Skin is warm. Findings: No rash. Neurological:      Mental Status: She is alert and oriented to person, place, and time. Coordination: Finger-Nose-Finger Test and Heel to [facility] Test normal.      Deep Tendon Reflexes: Strength normal.      Reflex Scores:       Tricep reflexes are 3+ on the right side and 3+ on the left side. Bicep reflexes are 3+ on the right side and 3+ on the left side. Brachioradialis reflexes are 3+ on the right side and 3+ on the left side. Patellar reflexes are 3+ on the right side and 3+ on the left side. Achilles reflexes are 3+ on the right side and 3+ on the left side. Psychiatric:         Mood and Affect: Mood normal.         Speech: Speech normal.         Behavior: Behavior normal.     Neurologic Exam     Mental Status   Oriented to person, place, and time. Follows 2 step commands. Attention: normal. Concentration: normal.   Speech: speech is normal   Level of consciousness: alert  Normal comprehension. Cranial Nerves     CN II   Visual fields full to confrontation. Right visual field deficit: none  Left visual field deficit: none     CN III, IV, VI   Pupils are equal, round, and reactive to light.   Extraocular motions are normal.   Right pupil: Shape: regular. Reactivity: brisk. Left pupil: Shape: regular. Reactivity: brisk. CN V   Facial sensation intact. Right facial sensation deficit: none  Left facial sensation deficit: none    CN VII   Facial expression full, symmetric. Right facial weakness: none  Left facial weakness: none    CN VIII   CN VIII normal.   Hearing: intact    CN IX, X   CN IX normal.   CN X normal.   Palate: symmetric    CN XI   CN XI normal.   Right sternocleidomastoid strength: normal  Left sternocleidomastoid strength: normal  Right trapezius strength: normal  Left trapezius strength: normal    CN XII   CN XII normal.   Tongue: not atrophic  Fasciculations: absent  Tongue deviation: none    Increased pain perception and tingling of right side face and neck     Motor Exam   Muscle bulk: normal  Overall muscle tone: normal  Right arm pronator drift: absent  Left arm pronator drift: absent    Strength   Strength 5/5 throughout. Sensory Exam   Light touch normal.     Gait, Coordination, and Reflexes     Coordination   Finger to nose coordination: normal  Heel to shin coordination: normal    Tremor   Resting tremor: absent    Reflexes   Right brachioradialis: 3+  Left brachioradialis: 3+  Right biceps: 3+  Left biceps: 3+  Right triceps: 3+  Left triceps: 3+  Right patellar: 3+  Left patellar: 3+  Right achilles: 3+  Left achilles: 3+     Labs/Imaging/Diagnostics   Labs:  CBC:  Recent Labs     07/20/22  1331 07/21/22  0418   WBC 10.5 8.7   RBC 4.66 4.62   HGB 14.1 14.1   HCT 43.4 43.6   MCV 93.1 94.4    313       CHEMISTRIES:  Recent Labs     07/20/22  1331 07/21/22  0418    144   K 4.6 3.8    108   CO2 27 27   BUN 17 13   CREATININE 0.8 0.6   GLUCOSE 99 114*       COAGULATION STUDIES:No results for input(s): PROTIME, INR, APTT in the last 72 hours. LIVER PROFILE:No results for input(s): AST, ALT, BILIDIR, BILITOT, ALKPHOS in the last 72 hours.   CHOLESTEROL AND A1C:  Recent Labs     07/20/22  1331   LDLCALC 55   HDL 35   CHOL 119   TRIG 143   LABA1C 5.9        Imaging Last 24 Hours:  ECHO Complete 2D W Doppler W Color    Result Date: 7/20/2022  Transthoracic Echocardiography Report (TTE)  Demographics   Patient Name     Jie Appiah   Gender                Female   MR #             [id number]   Race                                                  Ethnicity   Account #        [de-identified]   Room Number           5318   Accession Number [id number]  Date of Study         07/20/2022   Date of Birth    1965  Referring Physician   Arsen Hogue PA-C   Age              64 year(s)  Queenie Jcakson, CS                                Interpreting          Paxton Hou MD                               Physician             Echo reader of the week  Procedure Type of Study   TTE procedure:ECHOCARDIOGRAM COMPLETE 2D W DOPPLER W COLOR. Procedure Date Date: 07/20/2022 Start: 03:47 PM Study Location: Bedside Technical Quality: Adequate visualization Indications:Possible stroke. Additional Medical History:Hypertension, Hyperlipidemia, COPD, Smoker, Coronary artery disease. Patient Status: Routine Contrast Medium: Bubble Study. Height: 63 inches Weight: 148 pounds BSA: 1.7 m^2 BMI: 26.22 kg/m^2 BP: 105/79 mmHg  Conclusions   Summary  Ejection fraction is visually estimated at 55%. Overall left ventricular function is normal.  Moderate concentric left ventricular hypertrophy. Septal wall asymmetrical left ventricular hypertrophy. Mildly dilated left atrium. Aortic valve appears possibly bicuspid. Aortic valve leaflets are somewhat thickened. cant exclude some degree of stenosis which is difficult to quantitate due  to elevated LVOT velocity with LVOT obstruction that is significant with  mean gradient 44 mm hg  Mildly dilated left atrium.   bubble contrast study with no shunting   Signature   ----------------------------------------------------------------  Electronically signed by Ava Perez MD (Interpreting  physician) on 07/20/2022 at 06:57 PM  ----------------------------------------------------------------   Findings   Mitral Valve  Trace mitral regurgitation is present. Aortic Valve  Aortic valve appears possibly bicuspid. Aortic valve leaflets are somewhat thickened. cant exclude some degree of stenosis which is difficult to quantitate due  to elevated LVOT velocity with LVOT obstruction that is significant with  mean gradient 44 mm hg   Tricuspid Valve  Trivial tricuspid regurgitation visualized. Pulmonic Valve  Trivial pulmonic regurgitation visualized. The pulmonic valve was not well visualized . Left Atrium  Mildly dilated left atrium. bubble contrast study with no shunting   Left Ventricle  Ejection fraction is visually estimated at 55%. Overall left ventricular function is normal.  Moderate concentric left ventricular hypertrophy. Septal wall asymmetrical left ventricular hypertrophy. Right Atrium  Right atrial size was normal.   Right Ventricle  The right ventricular size was normal with normal systolic function and  wall thickness. Pericardial Effusion  The pericardium was normal in appearance with no evidence of a pericardial  effusion. Pleural Effusion  No evidence of pleural effusion. Aorta / Great Vessels  -Aortic root dimension within normal limits.  -The Pulmonary artery is within normal limits. -IVC size is within normal limits with normal respiratory phasic changes.   M-Mode/2D Measurements & Calculations   LV Diastolic    LV Systolic Dimension: 2.6  AV Cusp Separation: 1.8 cmAO  Dimension: 4 cm cm                          Root Dimension: 3.4 cmLA Area:  LV FS:35 %      LV Volume Diastolic: 70 ml  90.5 cm^2  LV PW           LV Volume Systolic: 06.5 ml  Diastolic: 1.8  LV EDV/LV EDV Index: 70  cm              ml/41 m^2LV ESV/LV ESV Septum          Index: 24.6 ml/14 m^2       RV Diastolic Dimension: 2.6 cm  Diastolic: 2 cm EF Calculated: 64.9 %                                              Ascending Aorta: 3.3 cm                                              LA volume/Index: 62.4 ml                                              /37m^2                  LVOT: 2 cm  Doppler Measurements & Calculations   MV Peak E-Wave: 102 cm/s   AV Peak Velocity: 297  MV Peak A-Wave: 112 cm/s   cm/s  MV E/A Ratio: 0.91         AV Peak Gradient:      TV Peak E-Wave: 63.4  MV Peak Gradient: 4.16     35.28 mmHg             cm/s  mmHg                       AV Mean Velocity: 198  TV Peak A-Wave: 41.6                             cm/s                   cm/s  MV Deceleration Time: 407  AV Mean Gradient: 18  msec                       mmHg                   TV Peak Gradient: 1.61                             AV VTI: 66.4 cm        mmHg                                                    TR Velocity:277 cm/s  MV E' Septal Velocity: 3.7                        TR Gradient:30.69 mmHg  cm/s                                              PV Peak Velocity: 59.6  MV A' Septal Velocity: 7.1 IVRT: 82 msec          cm/s  cm/s                                              PV Peak Gradient: 1.42  MV E' Lateral Velocity:                           mmHg  6.9 cm/s  MV A' Lateral Velocity:  9.7 cm/s  E/E' septal: 27.57  E/E' lateral: 14.78  MR Velocity: 530 cm/s  [email or web address] [id number]    MRI CERVICAL SPINE WO CONTRAST    Result Date: 7/20/2022  MR cervical spine without gadolinium Comparison: None Findings: Visualized intracranial contents are unremarkable. No cervical fluid collections or masses. Cervical cord normal. Status post anterior metallic and interbody fusion at C5-C6. Appropriate alignment at the surgical level. No evidence of hardware failure.  Grade 1 anterolisthesis of C3 on C4. Remaining cervical alignment unremarkable. C2-C3: Partial disc desiccation. No disc bulge or herniation. No central canal or neural foraminal narrowing. C3-C4: Grade 1 anterolisthesis. 4 mm central disc protrusion with moderate mass-effect on the thecal sac and mild mass-effect on the anterior aspect of the cord. Moderate osteoarthritis of the left facet joint. Moderate narrowing of the left neural foramen. No significant narrowing of the right neural foramen. C4-C5: Small disc osteophyte complex with moderate thecal sac effacement. No significant neural foraminal narrowing. C5-C6: Near complete ankylosis of the vertebral bodies. Bony proliferation at the posterior margin of the disc with mild thecal sac effacement. No significant neural foraminal narrowing. C6-C7: Disc osteophyte complex. No significant central canal narrowing. Mild stenosis of bilateral neural foramina. C7-T1: Normal     1. Status post anterior metallic and interbody fusion at C5-C6. Unremarkable postoperative appearance. 2. Grade 1 anterolisthesis and 4 mm central disc protrusion at C3-C4 with moderate mass-effect on the thecal sac and mild mass-effect on the anterior aspect of the cervical spinal cord. There is also moderate osteoarthritis of the left facet joint contributing to moderate stenosis of the left neural foramen at this level. This document has been electronically signed by: Brent Limon MD on 07/20/2022 10:38 PM    MRI THORACIC SPINE WO CONTRAST    Result Date: 7/20/2022  MR thoracic spine without gadolinium Comparison: None Findings: Normal alignment. No acute fracture or pathologic bone lesion. Hemangioma incidentally noted within the T3 and T11 vertebral bodies. Thoracic cord normal size and signal. No significant spinal canal or foraminal stenoses. No significant degenerative change. Paraspinous musculature intact. Findings at the level of the cervical and lumbar spine are reported separately.      No significant abnormality of the thoracic spine. This document has been electronically signed by: Phu Donaldson MD on 07/20/2022 10:29 PM    MRI LUMBAR SPINE WO CONTRAST    Result Date: 7/20/2022  MR lumbar spine without gadolinium Comparison: None Findings: No scoliosis or spondylolisthesis. No acute fracture or pathologic bone lesion. Possible chronic pars defects at L5. Small hemangiomas incidentally noted. The conus is at the T12 level. Paraspinous musculature intact. L1-L2: Mild broad-based disc bulge with minimal thecal sac effacement. No significant neural foraminal narrowing. L2-L3: Minimal broad-based disc bulge. No significant central canal or neural foraminal narrowing. L3-L4: Mild broad-based disc bulge with minimal thecal sac effacement. Mild facet osteoarthritis. No significant neural foraminal narrowing. L4-L5: 3 mm central disc protrusion with mild thecal sac effacement. Mild facet osteoarthritis. Mild bilateral neural foraminal narrowing. L5-S1: 2 mm central disc protrusion with mild thecal sac effacement. Mild facet osteoarthritis. Mild bilateral neural foraminal narrowing. 1. Possible chronic pars defects at L5. No associated alignment abnormality. 2. Tiny central disc protrusions at L4-L5 and L5-S1 with mild thecal sac effacement. 3. Additional chronic findings as above. This document has been electronically signed by: Phu Donaldson MD on 07/20/2022 10:46 PM    CT COMPARISON OF OUTSIDE FILMS    Result Date: 7/20/2022  Radiology exam is complete. No Radiologist dictation. Please follow up with ordering provider. CT COMPARISON OF OUTSIDE FILMS    Result Date: 7/20/2022  Radiology exam is complete. No Radiologist dictation. Please follow up with ordering provider. MRI BRAIN WO CONTRAST    Result Date: 7/20/2022  MR Brain without gadolinium. Comparison: None Findings: No restricted diffusion. No intracranial mass or hemorrhage.  1.4 cm chronic lacunar type infarct involving the right thalamus and right basal ganglia. Small focus of chronic infarction within the right cerebellar hemisphere. Involutional change of brain parenchyma, compatible with age. Numerous scattered foci of T2 hyperintensity within the white matter with involvement of both the superficial and deep white matter No midline shift. No hydrocephalus. Vascular flow voids are intact. The orbits are normal. The sinuses and mastoid air cells are clear. No focal bone lesion. 1. Numerous foci of T2 hyperintensity within the white matter. This may be on the basis of advanced small vessel ischemic change or a demyelinating process. 2. Chronic lacunar infarct involving the right basal ganglia and right thalamus. Small chronic infarct within the right cerebellar hemisphere. Flow voids This document has been electronically signed by: Loren Griffin MD on 07/20/2022 10:41 PM    XR COMPARISON OF OUTSIDE FILMS    Result Date: 7/20/2022  Radiology exam is complete. No Radiologist dictation. Please follow up with ordering provider. Assessment and Plan:        Subacute- Chronic Stroke of R Basal Ganglia, R Thalamus, R Cerebellum  Imaging  CT revealed hypodensity of the ant limb of internal capsule  CTA of head and neck revealed no significant stenosis. No need for carotid ultrasound. MRI Brain subacute to chronic infarcts of the right basal ganglia, thalamus, cerebellum. Chronic microvascular ischemic disease. MRI C-spine status post anterior fusion C5-C6. Grade 1 anterolisthesis and 4 mm central disc protrusion at C3-C4 with moderate mass-effect on the thecal sac and mild mass-effect on the anterior aspect of this cervical cord. Full read above. MRI T-spine negative for significant abnormality  MRI L-spine possible chronic pars defect at L5 without alignment abnormality small central disc protrusion at L4-L5, L5-S1 with mild thecal sac effacement  2D echo-EF 55%, LV hypertrophy, bubble study with no shunting.   Stat CT head is needed if the patient develops new-onset altered mental status, a severe headache, or new-onset neurologic deficit  Risk factors and medications  Hypertension management with goal blood pressure of <130/80 unless otherwise specified. Keep well hydrated. Initiate normal saline at 75 ml/hr as needed. Asprin 81 mg daily. Plavix 75 mg daily  HgbA1C  5.9   LDL goal of 45-70. Continue Lipitor 80  Smoking and alcohol cessation when applicable. Provide stroke eduction for individualized risk factors. DVT prophylaxis with SCDs and SQ heparin (hold heparin/lovenox for 24 hours after IV tPA or IA intervention)  Other recommendations  Dysphagia screen prior to oral intake  Consult Neurosurgery for MRI C Spine with mass effect on cord  Infectious W/U:  WBC 8.7  97.7  UA  CXR  PT/OT/SLP Consult  EKG/Telemetry to monitor for atrial fibrillation  NIHSS every shift. Neuro checks per unit unless otherwise specified. Head of bed 45 degree. No further recommendations per neurology, we will sign off at this time. Please call with additional questions. This case was discussed with Dr. Elie Lamas and he is in agreement with the assessment and plan.     Electronically signed by Kalyn Valle PA-C on 7/21/22 at 7:16 AM EDT

## 2022-07-21 NOTE — FLOWSHEET NOTE
Pt was in bed at the time of the visit. She was dealing with stroke-like-symptoms. Prayer was appreciated. She was in good spirits. 07/21/22 1427   Encounter Summary   Encounter Overview/Reason  Initial Encounter   Service Provided For: Patient and family together   Referral/Consult From: [facility]   Support System Spouse   Last Encounter  07/21/22   Complexity of Encounter Low   Begin Time 1001   End Time  1007   Total Time Calculated 6 min   Spiritual/Emotional needs   Type Spiritual Support   Assessment/Intervention/Outcome   Assessment Calm; Hopeful   Intervention Active listening;Empowerment

## 2022-07-22 VITALS
HEIGHT: 63 IN | RESPIRATION RATE: 18 BRPM | WEIGHT: 148 LBS | BODY MASS INDEX: 26.22 KG/M2 | OXYGEN SATURATION: 93 % | DIASTOLIC BLOOD PRESSURE: 77 MMHG | HEART RATE: 68 BPM | SYSTOLIC BLOOD PRESSURE: 111 MMHG | TEMPERATURE: 97.5 F

## 2022-07-22 LAB
ANION GAP SERPL CALCULATED.3IONS-SCNC: 10 MEQ/L (ref 8–16)
BUN BLDV-MCNC: 18 MG/DL (ref 7–22)
CALCIUM SERPL-MCNC: 9 MG/DL (ref 8.5–10.5)
CHLORIDE BLD-SCNC: 106 MEQ/L (ref 98–111)
CO2: 25 MEQ/L (ref 23–33)
CREAT SERPL-MCNC: 0.6 MG/DL (ref 0.4–1.2)
ERYTHROCYTE [DISTWIDTH] IN BLOOD BY AUTOMATED COUNT: 13 % (ref 11.5–14.5)
ERYTHROCYTE [DISTWIDTH] IN BLOOD BY AUTOMATED COUNT: 44.6 FL (ref 35–45)
GFR SERPL CREATININE-BSD FRML MDRD: > 90 ML/MIN/1.73M2
GLUCOSE BLD-MCNC: 91 MG/DL (ref 70–108)
HCT VFR BLD CALC: 42.9 % (ref 37–47)
HEMOGLOBIN: 14 GM/DL (ref 12–16)
MCH RBC QN AUTO: 30.4 PG (ref 26–33)
MCHC RBC AUTO-ENTMCNC: 32.6 GM/DL (ref 32.2–35.5)
MCV RBC AUTO: 93.3 FL (ref 81–99)
ORGANISM: ABNORMAL
PLATELET # BLD: 312 THOU/MM3 (ref 130–400)
PMV BLD AUTO: 10.4 FL (ref 9.4–12.4)
POTASSIUM REFLEX MAGNESIUM: 4 MEQ/L (ref 3.5–5.2)
RBC # BLD: 4.6 MILL/MM3 (ref 4.2–5.4)
SODIUM BLD-SCNC: 141 MEQ/L (ref 135–145)
URINE CULTURE, ROUTINE: ABNORMAL
WBC # BLD: 8.6 THOU/MM3 (ref 4.8–10.8)

## 2022-07-22 PROCEDURE — 85027 COMPLETE CBC AUTOMATED: CPT

## 2022-07-22 PROCEDURE — 6370000000 HC RX 637 (ALT 250 FOR IP)

## 2022-07-22 PROCEDURE — 97165 OT EVAL LOW COMPLEX 30 MIN: CPT

## 2022-07-22 PROCEDURE — 36415 COLL VENOUS BLD VENIPUNCTURE: CPT

## 2022-07-22 PROCEDURE — 99239 HOSP IP/OBS DSCHRG MGMT >30: CPT | Performed by: INTERNAL MEDICINE

## 2022-07-22 PROCEDURE — 80048 BASIC METABOLIC PNL TOTAL CA: CPT

## 2022-07-22 RX ORDER — SUMATRIPTAN 50 MG/1
50 TABLET, FILM COATED ORAL
Qty: 9 TABLET | Refills: 5 | Status: SHIPPED | OUTPATIENT
Start: 2022-07-22 | End: 2022-09-14

## 2022-07-22 RX ORDER — HYDRALAZINE HYDROCHLORIDE 50 MG/1
25 TABLET, FILM COATED ORAL 2 TIMES DAILY
Qty: 90 TABLET | Refills: 3 | Status: SHIPPED | OUTPATIENT
Start: 2022-07-22

## 2022-07-22 RX ORDER — LISINOPRIL 40 MG/1
40 TABLET ORAL
Qty: 30 TABLET | Refills: 3 | Status: SHIPPED | OUTPATIENT
Start: 2022-07-22 | End: 2022-07-22 | Stop reason: SDUPTHER

## 2022-07-22 RX ORDER — POLYETHYLENE GLYCOL 3350 17 G/17G
17 POWDER, FOR SOLUTION ORAL DAILY PRN
Qty: 527 G | Refills: 1 | Status: SHIPPED | OUTPATIENT
Start: 2022-07-22 | End: 2022-08-21

## 2022-07-22 RX ORDER — LISINOPRIL 10 MG/1
10 TABLET ORAL DAILY
Qty: 30 TABLET | Refills: 0 | Status: SHIPPED | OUTPATIENT
Start: 2022-07-22

## 2022-07-22 RX ORDER — HYDROCODONE BITARTRATE AND ACETAMINOPHEN 5; 325 MG/1; MG/1
1 TABLET ORAL EVERY 8 HOURS PRN
Qty: 9 TABLET | Refills: 0 | Status: SHIPPED | OUTPATIENT
Start: 2022-07-22 | End: 2022-07-25

## 2022-07-22 RX ORDER — ASPIRIN 81 MG/1
81 TABLET ORAL DAILY
Qty: 30 TABLET | Refills: 3 | Status: SHIPPED | OUTPATIENT
Start: 2022-07-23

## 2022-07-22 RX ADMIN — ASPIRIN 81 MG: 81 TABLET, COATED ORAL at 09:04

## 2022-07-22 RX ADMIN — CLOPIDOGREL BISULFATE 75 MG: 75 TABLET ORAL at 09:04

## 2022-07-22 RX ADMIN — ATORVASTATIN CALCIUM 80 MG: 80 TABLET, FILM COATED ORAL at 09:04

## 2022-07-22 ASSESSMENT — PAIN SCALES - GENERAL
PAINLEVEL_OUTOF10: 0

## 2022-07-22 NOTE — DISCHARGE SUMMARY
Discharge Summary    Patient:  Kurtis Velasco  YOB: 1965    MRN: [id number]   Acct: [de-identified]    Primary Care Physician: QUEENIE Beltran CNP    Admit date:  7/20/2022    Discharge date:  7/22/2022       Discharge Diagnoses:   Stroke-like symptoms  Principal Problem:    Stroke-like symptoms  Resolved Problems:    * No resolved hospital problems. *        Admitted for: (HPI) severe headache    Hospital Course: Briefly this is a 55-year-old female that was admitted secondary to a severe headache and nonspecific symptoms of intermittent \"clumsiness\" of right upper extremity with paresthesias in the right upper extremity, she was admitted and evaluated by neurology she was not determined to have had any ischemic event or ischemic stroke she underwent MRI of the brain that was within normal limits however it did show old signs of prior ischemic strokes,, the patient underwent cervical imaging as well secondary to chronic cervical neck pain the patient was evaluated by neurosurgery no acute neurosurgical invention was warranted the patient is ambulatory within the room she will be discharged in good condition with oral medication sumatriptan for migraine headaches, she will resume her cardiac medications at doses written for and has a pending outpatient appointment with an outside cardiologist.    Consultants:  Patient Care Team:  QUEENIE Beltran CNP as PCP - General (Nurse Practitioner)    Discharge Medications:       Medication List        START taking these medications      aspirin 81 MG EC tablet  Take 1 tablet by mouth in the morning. Start taking on: July 23, 2022     HYDROcodone-acetaminophen 5-325 MG per tablet  Commonly known as: Norco  Take 1 tablet by mouth every 8 hours as needed for Pain for up to 3 days. Intended supply: 3 days.  Take lowest dose possible to manage pain     polyethylene glycol 17 g packet  Commonly known as: GLYCOLAX  Take 17 g by mouth daily as needed for Constipation     SUMAtriptan 50 MG tablet  Commonly known as: IMITREX  Take 1 tablet by mouth once as needed for Migraine            CHANGE how you take these medications      hydrALAZINE 50 MG tablet  Commonly known as: APRESOLINE  Take 0.5 tablets by mouth in the morning and at bedtime  What changed:   how much to take  when to take this     lisinopril 10 MG tablet  Commonly known as: PRINIVIL;ZESTRIL  Take 1 tablet by mouth in the morning.   What changed:   medication strength  how much to take  Notes to patient: DOSE DECREASED TO 10 MG DAILY             CONTINUE taking these medications      amLODIPine 5 MG tablet  Commonly known as: NORVASC     atorvastatin 80 MG tablet  Commonly known as: LIPITOR     carvedilol 25 MG tablet  Commonly known as: COREG     clopidogrel 75 MG tablet  Commonly known as: PLAVIX     nitroGLYCERIN 0.3 MG SL tablet  Commonly known as: NITROSTAT     spironolactone 50 MG tablet  Commonly known as: ALDACTONE               Where to Get Your Medications        These medications were sent to [address] [place] [address] , [address], [address] [place] [address] [address] [address]      Phone: [phone]   aspirin 81 MG EC tablet  hydrALAZINE 50 MG tablet  lisinopril 10 MG tablet  polyethylene glycol 17 g packet       You can get these medications from any pharmacy    Bring a paper prescription for each of these medications  HYDROcodone-acetaminophen 5-325 MG per tablet  SUMAtriptan 50 MG tablet           Physical Exam:    Vitals:  Vitals:    07/21/22 2031 07/21/22 2339 07/22/22 0332 07/22/22 0845   BP: (!) 164/98 (!) 148/85 (!) 153/89 111/77   Pulse: 65 65 60 68   Resp: 16 16 16 18   Temp: 98.1 °F (36.7 °C) 97.9 °F (36.6 °C) 97.7 °F (36.5 °C) 97.5 °F (36.4 °C)   TempSrc:  Oral Oral Oral   SpO2: 98% 93% 92% 93%   Weight:       Height:         Weight: Weight: 148 lb (67.1 kg)     24 hour intake/output:No intake or output data in the 24 hours ending 07/22/22 1513    General appearance - alert, awake appears to be in no acute distress  HEENT: Atraumatic normocephalic, no JVD. Trachea midline. Chest - Bilateral air entry, no wheezes, crackles or rhonchi  Cardiovascular - S1S2 RRR, no murmurs or gallops  Abdomen - Soft non tender non distended, normoactive bowel sounds  Neurological - non focal, no neurological deficits noted  Integumentary - Skin color, texture, turgor normal. No Rashes or lesions  Musculoskeletal -Full ROM, No clubbing or cyanosis  Extremities: No peripheral edema    Procedures: MRI brain    Diagnostic Test: Not applicable    Radiology reports as per the Radiologist  Radiology:  ECHO Complete 2D W Doppler W Color    Result Date: 7/21/2022  Transthoracic Echocardiography Report (TTE)  Demographics   Patient Name     Aaron Morales   Gender                Female   MR #             [id number]   Race                                                  Ethnicity   Account #        [de-identified]   Room Number           0019   Accession Number [id number]  Date of Study         07/20/2022   Date of Birth    1965  Referring Physician   Ines Castaneda PA-C   Age              64 year(s)  Char Sanchez, CS                                Interpreting          Macey Jane MD                               Physician             Echo reader of the week  Procedure Type of Study   TTE procedure:ECHOCARDIOGRAM COMPLETE 2D W DOPPLER W COLOR. Procedure Date Date: 07/20/2022 Start: 03:47 PM Study Location: Bedside Technical Quality: Adequate visualization Indications:Possible stroke. Additional Medical History:Hypertension, Hyperlipidemia, COPD, Smoker, Coronary artery disease. Patient Status: Routine Contrast Medium: Bubble Study.  Height: 63 inches Weight: 148 pounds BSA: 1.7 m^2 BMI: 26.22 kg/m^2 BP: 105/79 mmHg  Conclusions   Summary Ejection fraction is visually estimated at 55%. Overall left ventricular function is normal.  Moderate concentric left ventricular hypertrophy. Septal wall asymmetrical left ventricular hypertrophy. Mildly dilated left atrium. Aortic valve appears possibly bicuspid. Aortic valve leaflets are somewhat thickened. cant exclude some degree of stenosis which is difficult to quantitate due  to elevated LVOT velocity with LVOT obstruction that is significant with  mean gradient 44 mm hg  Mildly dilated left atrium. bubble contrast study with no shunting   Signature   ----------------------------------------------------------------  Electronically signed by Mendez Argueta MD (Interpreting  physician) on 07/21/2022 at 07:05 PM  ----------------------------------------------------------------   Findings   Mitral Valve  Trace mitral regurgitation is present. Aortic Valve  Aortic valve appears possibly bicuspid. Aortic valve leaflets are somewhat thickened. cant exclude some degree of stenosis which is difficult to quantitate due  to elevated LVOT velocity with LVOT obstruction that is significant with  mean gradient 44 mm hg   Tricuspid Valve  Trivial tricuspid regurgitation visualized. Pulmonic Valve  Trivial pulmonic regurgitation visualized. The pulmonic valve was not well visualized . Left Atrium  Mildly dilated left atrium. bubble contrast study with no shunting   Left Ventricle  Ejection fraction is visually estimated at 55%. Overall left ventricular function is normal.  Moderate concentric left ventricular hypertrophy. Septal wall asymmetrical left ventricular hypertrophy. Right Atrium  Right atrial size was normal.   Right Ventricle  The right ventricular size was normal with normal systolic function and  wall thickness. Pericardial Effusion  The pericardium was normal in appearance with no evidence of a pericardial  effusion. Pleural Effusion  No evidence of pleural effusion.    Aorta / Great Vessels  -Aortic root dimension within normal limits.  -The Pulmonary artery is within normal limits. -IVC size is within normal limits with normal respiratory phasic changes.   M-Mode/2D Measurements & Calculations   LV Diastolic    LV Systolic Dimension: 2.6  AV Cusp Separation: 1.8 cmAO  Dimension: 4 cm cm                          Root Dimension: 3.4 cmLA Area:  LV FS:35 %      LV Volume Diastolic: 70 ml  83.9 cm^2  LV PW           LV Volume Systolic: 03.3 ml  Diastolic: 1.8  LV EDV/LV EDV Index: 70  cm              ml/41 m^2LV ESV/LV ESV  Septum          Index: 24.6 ml/14 m^2       RV Diastolic Dimension: 2.6 cm  Diastolic: 2 cm EF Calculated: 64.9 %                                              Ascending Aorta: 3.3 cm                                              LA volume/Index: 62.4 ml                                              /37m^2                  LVOT: 2 cm  Doppler Measurements & Calculations   MV Peak E-Wave: 102 cm/s   AV Peak Velocity: 297  MV Peak A-Wave: 112 cm/s   cm/s  MV E/A Ratio: 0.91         AV Peak Gradient:      TV Peak E-Wave: 63.4  MV Peak Gradient: 4.16     35.28 mmHg             cm/s  mmHg                       AV Mean Velocity: 198  TV Peak A-Wave: 41.6                             cm/s                   cm/s  MV Deceleration Time: 407  AV Mean Gradient: 18  msec                       mmHg                   TV Peak Gradient: 1.61                             AV VTI: 66.4 cm        mmHg                                                    TR Velocity:277 cm/s  MV E' Septal Velocity: 3.7                        TR Gradient:30.69 mmHg  cm/s                                              PV Peak Velocity: 59.6  MV A' Septal Velocity: 7.1 IVRT: 82 msec          cm/s  cm/s                                              PV Peak Gradient: 1.42  MV E' Lateral Velocity:                           mmHg  6.9 cm/s  MV A' Lateral Velocity:  9.7 cm/s  E/E' septal: 27.57  E/E' lateral: 14.78  MR Velocity: 530 cm/s  [email or web address] [id number]%[id number]%3d%3d    CT CERVICAL SPINE WO CONTRAST    Result Date: 7/21/2022  PROCEDURE: CT CERVICAL SPINE WO CONTRAST CLINICAL INFORMATION: rule out significant adjacent level disease. Neck pain for 2 weeks. COMPARISON: MRI cervical spine dated 7/20/2022. TECHNIQUE: 3 mm noncontrast axial images were obtained through the cervical spine with sagittal and coronal reconstructions. All CT scans at this facility use dose modulation, iterative reconstruction, and/or weight-based dosing when appropriate to reduce radiation dose to as low as reasonably achievable. FINDINGS: Redemonstration of fusion of C5-6 with anterior screw-plate fixation bridging this level. There is no evidence of hardware failure or loosening. There is associated straightening of the cervical lordosis. There is minimal, grade 1, anterolisthesis of C2 relative to C3 and C3 relative to C4 on the basis of degenerative change, stable compared to prior MRI. There is disc space narrowing throughout the nonfused levels of the cervical spine, similar to prior MRI. No fracture of the cervical vertebral column  is identified. The craniocervical junction appears intact. No paraspinal or epidural fluid collection is identified. Paraspinal soft tissues are grossly unremarkable. At C2-3 there is no significant spinal canal or neuroforaminal stenosis. At C3-4 there is partial uncovering the disc with superimposed shallow disc bulge without significant spinal canal stenosis. There is moderate left neural frontal stenosis in association with facet hypertrophy. At C4-5 there is a shallow disc bulge which causes mild spinal canal stenosis. There is mild left and moderate right neuroforaminal stenosis in association with uncovertebral joint degenerative change. At C5-6 there is no significant spinal canal or neuroforaminal stenosis.  At C6-7 there is a shallow disc bulge without significant spinal canal stenosis. There is mild bilateral foraminal stenosis in association with mild uncovertebral joint degenerative change. At C7-T1 there is no significant spinal canal or neuroforaminal stenosis. 1. Redemonstration of ACDF bridging C5-6 without evidence of acute abnormality. 2. Multilevel degenerative changes of the cervical spine with areas of up to mild spinal canal stenosis and moderate neural foraminal stenosis. **This report has been created using voice recognition software. It may contain minor errors which are inherent in voice recognition technology. ** Final report electronically signed by Dr. Can Mehta MD on 7/21/2022 4:18 PM    MRI CERVICAL SPINE WO CONTRAST    Result Date: 7/20/2022  MR cervical spine without gadolinium Comparison: None Findings: Visualized intracranial contents are unremarkable. No cervical fluid collections or masses. Cervical cord normal. Status post anterior metallic and interbody fusion at C5-C6. Appropriate alignment at the surgical level. No evidence of hardware failure. Grade 1 anterolisthesis of C3 on C4. Remaining cervical alignment unremarkable. C2-C3: Partial disc desiccation. No disc bulge or herniation. No central canal or neural foraminal narrowing. C3-C4: Grade 1 anterolisthesis. 4 mm central disc protrusion with moderate mass-effect on the thecal sac and mild mass-effect on the anterior aspect of the cord. Moderate osteoarthritis of the left facet joint. Moderate narrowing of the left neural foramen. No significant narrowing of the right neural foramen. C4-C5: Small disc osteophyte complex with moderate thecal sac effacement. No significant neural foraminal narrowing. C5-C6: Near complete ankylosis of the vertebral bodies. Bony proliferation at the posterior margin of the disc with mild thecal sac effacement. No significant neural foraminal narrowing. C6-C7: Disc osteophyte complex.  No significant central canal narrowing. Mild stenosis of bilateral neural foramina. C7-T1: Normal     1. Status post anterior metallic and interbody fusion at C5-C6. Unremarkable postoperative appearance. 2. Grade 1 anterolisthesis and 4 mm central disc protrusion at C3-C4 with moderate mass-effect on the thecal sac and mild mass-effect on the anterior aspect of the cervical spinal cord. There is also moderate osteoarthritis of the left facet joint contributing to moderate stenosis of the left neural foramen at this level. This document has been electronically signed by: Patti Floyd MD on 07/20/2022 10:38 PM    MRI THORACIC SPINE WO CONTRAST    Result Date: 7/20/2022  MR thoracic spine without gadolinium Comparison: None Findings: Normal alignment. No acute fracture or pathologic bone lesion. Hemangioma incidentally noted within the T3 and T11 vertebral bodies. Thoracic cord normal size and signal. No significant spinal canal or foraminal stenoses. No significant degenerative change. Paraspinous musculature intact. Findings at the level of the cervical and lumbar spine are reported separately. No significant abnormality of the thoracic spine. This document has been electronically signed by: Patti Floyd MD on 07/20/2022 10:29 PM    MRI LUMBAR SPINE WO CONTRAST    Result Date: 7/20/2022  MR lumbar spine without gadolinium Comparison: None Findings: No scoliosis or spondylolisthesis. No acute fracture or pathologic bone lesion. Possible chronic pars defects at L5. Small hemangiomas incidentally noted. The conus is at the T12 level. Paraspinous musculature intact. L1-L2: Mild broad-based disc bulge with minimal thecal sac effacement. No significant neural foraminal narrowing. L2-L3: Minimal broad-based disc bulge. No significant central canal or neural foraminal narrowing. L3-L4: Mild broad-based disc bulge with minimal thecal sac effacement. Mild facet osteoarthritis. No significant neural foraminal narrowing.  L4-L5: 3 mm central disc protrusion with mild thecal sac effacement. Mild facet osteoarthritis. Mild bilateral neural foraminal narrowing. L5-S1: 2 mm central disc protrusion with mild thecal sac effacement. Mild facet osteoarthritis. Mild bilateral neural foraminal narrowing. 1. Possible chronic pars defects at L5. No associated alignment abnormality. 2. Tiny central disc protrusions at L4-L5 and L5-S1 with mild thecal sac effacement. 3. Additional chronic findings as above. This document has been electronically signed by: Phu Donaldson MD on 07/20/2022 10:46 PM    US THYROID    Result Date: 7/21/2022  PROCEDURE: US THYROID CLINICAL INFORMATION: Hypodense mass involving left lobe of thyroid noted on CTA neck today COMPARISON: CT neck 7/19/2022 TECHNIQUE: Grayscale and color sonographic imaging of the thyroid gland performed in longitudinal and transverse planes. FINDINGS: THYROID SIZE: The right thyroid lobe measures 4.7 x 2 x 1.4 cm. The left thyroid lobe measures 4.9 x 2 x 1.8 cm. The isthmus measures 4 mm ECHOTEXTURE: Relatively inhomogeneous. RIGHT LOBE NODULES: 1. A 1.4 x 0.8 x 1.1 cm isoechoic nodule is seen in the mid right thyroid lobe. Margin: Smooth; Taller than wide: Yes ; Echogenic foci: None. Total points: 6. ACR category: TR 4. LEFT LOBE NODULES: 1. A 1.4 x 1.2 x 1.5 cm isoechoic nodule is seen in the mid left thyroid lobe Margin: Ill-defined; Taller than wide: Yes ; Echogenic foci: None. Total points: 6. ACR category: TR 4. 2. A 1.9 x 2.6 x 1.4 cm isoechoic nodule is seen in the inferior left thyroid lobe. Margin: Smooth; Taller than wide: No ; Echogenic foci: None. Total points: 3. ACR category: TR 3. ISTHMUS NODULES: None CERVICAL LYMPHADENOPATHY: 1. There are no pathologically enlarged lymph nodes adjacent to the thyroid gland. 1. The 2.6 cm isoechoic nodule in the inferior left thyroid lobe is a TR 3 nodule. Ultrasound-guided fine-needle aspiration is recommended.  2. Other thyroid nodules as described above. ACR TI-RADS Category and recommendations TR 1: 0 point. Benign. No FNA TR 2: 1,2 points-Not suspicious. No FNA TR 3: 3 points -Mildly suspicion. FNA is recommended for lesions greater than 2.5 cm; follow up if the nodule is greater than or equal to 1.5 cm. Follow-up can be done at 1, 3 and 5 years. Continued follow-up after 5 years can be obtained if there is no stability in size. TR 4: 4-6 points: Moderately suspicion. FNA greater than or equal to 1.5 cm; follow-up if the nodule is greater than or equal to 1 cm- follow-up can be done at 1, 2, 3 and 5 years. Continued follow-up after 5 years can be obtained if there is no stability in size. TR 5: 7+points -Highly Suspicious. FNA greater than 1 cm, follow-up if greater than or equal to 0.5 cm **This report has been created using voice recognition software. It may contain minor errors which are inherent in voice recognition technology. ** Final report electronically signed by Dr Gwen Burks on 7/21/2022 8:53 AM    CT COMPARISON OF OUTSIDE FILMS    Result Date: 7/20/2022  Radiology exam is complete. No Radiologist dictation. Please follow up with ordering provider. CT COMPARISON OF OUTSIDE FILMS    Result Date: 7/20/2022  Radiology exam is complete. No Radiologist dictation. Please follow up with ordering provider. MRI BRAIN WO CONTRAST    Result Date: 7/20/2022  MR Brain without gadolinium. Comparison: None Findings: No restricted diffusion. No intracranial mass or hemorrhage. 1.4 cm chronic lacunar type infarct involving the right thalamus and right basal ganglia. Small focus of chronic infarction within the right cerebellar hemisphere. Involutional change of brain parenchyma, compatible with age. Numerous scattered foci of T2 hyperintensity within the white matter with involvement of both the superficial and deep white matter No midline shift. No hydrocephalus. Vascular flow voids are intact.  The orbits are normal. The sinuses and SEEN    Casts NONE SEEN NONE SEEN /lpf    Crystals NONE SEEN NONE SEEN    Renal Epithelial, UA NONE SEEN NONE SEEN    Yeast, UA NONE SEEN NONE SEEN    Casts NONE SEEN /lpf    Miscellaneous Lab Test Result NONE SEEN    CBC   Result Value Ref Range    WBC 8.6 4.8 - 10.8 thou/mm3    RBC 4.60 4.20 - 5.40 mill/mm3    Hemoglobin 14.0 12.0 - 16.0 gm/dl    Hematocrit 42.9 37.0 - 47.0 %    MCV 93.3 81.0 - 99.0 fL    MCH 30.4 26.0 - 33.0 pg    MCHC 32.6 32.2 - 35.5 gm/dl    RDW-CV 13.0 11.5 - 14.5 %    RDW-SD 44.6 35.0 - 45.0 fL    Platelets 674 754 - 970 thou/mm3    MPV 10.4 9.4 - 12.4 fL   Basic Metabolic Panel w/ Reflex to MG   Result Value Ref Range    Sodium 141 135 - 145 meq/L    Potassium reflex Magnesium 4.0 3.5 - 5.2 meq/L    Chloride 106 98 - 111 meq/L    CO2 25 23 - 33 meq/L    Glucose 91 70 - 108 mg/dL    BUN 18 7 - 22 mg/dL    Creatinine 0.6 0.4 - 1.2 mg/dL    Calcium 9.0 8.5 - 10.5 mg/dL   Anion Gap   Result Value Ref Range    Anion Gap 10.0 8.0 - 16.0 meq/L   Glomerular Filtration Rate, Estimated   Result Value Ref Range    Est, Glom Filt Rate >90 ml/min/1.73m2   EKG 12 lead   Result Value Ref Range    Ventricular Rate 66 BPM    Atrial Rate 66 BPM    P-R Interval 168 ms    QRS Duration 154 ms    Q-T Interval 490 ms    QTc Calculation (Bazett) 513 ms    P Axis 66 degrees    R Axis -16 degrees    T Axis 145 degrees   EKG 12 Lead   Result Value Ref Range    Ventricular Rate 68 BPM    Atrial Rate 68 BPM    P-R Interval 168 ms    QRS Duration 158 ms    Q-T Interval 470 ms    QTc Calculation (Bazett) 499 ms    P Axis 57 degrees    R Axis -13 degrees    T Axis 158 degrees   EKG 12 Lead   Result Value Ref Range    Ventricular Rate 62 BPM    Atrial Rate 62 BPM    P-R Interval 174 ms    QRS Duration 162 ms    Q-T Interval 480 ms    QTc Calculation (Bazett) 487 ms    P Axis 55 degrees    R Axis -25 degrees    T Axis 150 degrees       Diet:  No diet orders on file    Activity:  Activity as tolerated (Patient may move about with assist as indicated or with supervision.)    Follow-up:  in 2 weeks with QUEENIE Correa CNP,  in weeks 2 follow-up with your primary care doctor    Disposition: home    Condition: Stable      Time Spent: 35 minutes    Electronically signed by Ranjana Stewart MD on 7/22/2022 at 3:13 PM    Discharging Hospitalist

## 2022-07-22 NOTE — PROGRESS NOTES
Deborah Guevara 60  PHYSICAL THERAPY MISSED TREATMENT NOTE  [facility] NEUROSCIENCES 4A    Date: 2022  Patient Name: Mamie Cerrato        MRN: [id number]   : 1965  (60 y.o.)  Gender: female   Referring Practitioner: Denver Mason, PA-C  Diagnosis: stroke-like symptoms         REASON FOR MISSED TREATMENT:  Pt up in chair on arrival dressed and packed up, pt stated that she is waiting for her daughter Pt reported that she is planning on staying with her daughter for at least a week and had no questions or concerns about discharge home today. Sent pt [employer] message with phone # for the sleep study, just in case they haven't called her yet.

## 2022-07-22 NOTE — CARE COORDINATION
7/22/22, 2:27 PM EDT    Patient goals/plan/ treatment preferences discussed by  and . Patient goals/plan/ treatment preferences reviewed with patient/ family. Patient/ family verbalize understanding of discharge plan and are in agreement with goal/plan/treatment preferences. Understanding was demonstrated using the teach back method. AVS provided by RN at time of discharge, which includes all necessary medical information pertaining to the patients current course of illness, treatment, post-discharge goals of care, and treatment preferences. Services At/After Discharge: None          Spoke with Bhanu Messer this morning before she left. She told SW that she is going to stay with her daughter. She does not want any assistance from  at this time.

## 2022-07-22 NOTE — PROGRESS NOTES
Socorroacelily Guevara 60  INPATIENT OCCUPATIONAL THERAPY  [facility] NEUROSCIENCES 4A  EVALUATION    Time:    Time In: 8434  Time Out: 1005  Timed Code Treatment Minutes: 0 Minutes  Minutes: 15          Date: 2022  Patient Name: Isaac Jerry,   Gender: female      MRN: [id number]  : 1965  (64 y.o.)  Referring Practitioner: Gricel Rosario PA-C  Diagnosis: stroke-like symptoms  Additional Pertinent Hx: Per ER note on 2022:55 y/o  female with a PMHx of CAD, COPD, HLD, HTN who presents to [facility] via direct admit today for the evaluation of numbness and tingling of face and intermittent right leg pain. Pt reports yesterday morning she woke up from sleep and noticed head pain that was located more on the right side of her face in the forehead and around her right eye, that occasionally would radiate to the back of the head. She denies vision changes, but states she also has associated right eye tearing. This morning she also experienced a self-limiting episode of confusion and disorientation, and significant generalized weakness for which she was unable to get out Per Neurology pt presents with Acute sensory changes, bilateral lower extremity pain and weakness, incontinence in the setting of low back pain and hyperreflexia. CT head Negative for acute findings, age indeterminate infarct with anterior limb of right internal capsule. 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 She also notes new right hip pain that is intermittent. The pain is severe, radiates down her right leg.     Restrictions/Precautions:  Restrictions/Precautions: Fall Risk, General Precautions    Subjective  Chart Reviewed: Yes, Orders, Progress Notes, History and Physical  Patient assessed for rehabilitation services?: Yes  Family / Caregiver Present: No    Subjective: cooperative, pleasant, hopeful to go home    Pain: 2/10: headache    Vitals: Vitals not assessed per clinical judgement, see nursing flowsheet    Social/Functional History:  Lives With: Significant other (SO + 18 month old grandson)  Type of Home: Apartment  Home Layout: One level  Home Access: Stairs to enter with rails  Entrance Stairs - Number of Steps: 6  Entrance Stairs - Rails: Left  Home Equipment:  (no DME)   Bathroom Shower/Tub: Tub only  Bathroom Toilet: Standard    Receives Help From: Family  ADL Assistance: Independent  Homemaking Assistance: Independent  Ambulation Assistance: Independent  Transfer Assistance: Independent    Active : No  Patient's  Info: boyfriend and kids  Occupation: Unemployed  Additional Comments: Pt's boyfriend works full time, pt has been caring for her 18 month old grandson. Pt plans to stay with 33yo daughter at [address] [facility] [address] [place] home with 2 steps to enter. Pt prefers to get down into tub. Pt indep with no AD PTA. Pt states her children can assist if needed    VISION: hx catarcts in both eyes-awaiting sx for this. Pt reports hx of blurried vision with onset of symptoms prior to admission    HEARING:  WFL    COGNITION: WFL    RANGE OF MOTION:  Bilateral Upper Extremity:  WFL    STRENGTH:  Bilateral Upper Extremity:  WFL    SENSATION:   WFL    ADL:   Lower Extremity Dressing: Independent. Able to don slipper socks while seated EOB . BALANCE:  Standing Balance: Independent. BED MOBILITY:  Supine to Sit: Modified Independent HOB elevated    TRANSFERS:  Sit to Stand:  Independent. Stand to Sit: Independent. FUNCTIONAL MOBILITY:  Assistive Device: None  Assist Level: Independent. Distance:  around nursing unit        Activity Tolerance:  Patient tolerance of  treatment: good. Assessment:  Assessment: Pt demo baseline for ADLs & IADLs, no further OT indicated at this time.   Prognosis: Good  Decision Making: Low Complexity    Treatment Initiated: No treatment initiated    Discharge Recommendations:  Home with assist PRN    Patient Education:     Patient Education  Education Given To: Patient  Education Provided: Role of Therapy  Education Method: Verbal  Barriers to Learning: None  Education Outcome: Verbalized understanding    Equipment Recommendations:  Equipment Needed: No    Plan:No further OT indicated at this time      Following session, patient left in safe position with all fall risk precautions in place.

## 2022-07-22 NOTE — PROGRESS NOTES
Discharge teaching and instructions completed with patient using teachback method. AVS reviewed. Printed prescriptions given to patient. Patient voiced understanding regarding prescriptions, follow up appointments, and care of self at home. Discharged home with all belongings.

## 2022-07-22 NOTE — PLAN OF CARE
Problem: Discharge Planning  Goal: Discharge to home or other facility with appropriate resources  7/22/2022 0112 by Darion Mendez RN  Flowsheets (Taken 7/21/2022 2010)  Discharge to home or other facility with appropriate resources: Identify barriers to discharge with patient and caregiver  7/21/2022 1525 by ANGELA Crump  Outcome: Progressing  7/21/2022 1141 by Maureen Ballard RN  Flowsheets (Taken 7/21/2022 9043)  Discharge to home or other facility with appropriate resources:   Identify barriers to discharge with patient and caregiver   Identify discharge learning needs (meds, wound care, etc)  Note: Discharge planning in process and discussed with patient/family. Social work consulted for any additional needs. Care manager aware of discharge needs. Problem: Pain  Goal: Verbalizes/displays adequate comfort level or baseline comfort level  7/22/2022 0112 by Darion Mendez RN  Outcome: Progressing  Flowsheets (Taken 7/20/2022 2215 by Rogelio Blackman RN)  Verbalizes/displays adequate comfort level or baseline comfort level:   Encourage patient to monitor pain and request assistance   Assess pain using appropriate pain scale  7/21/2022 1141 by Maureen Ballard RN  Outcome: Progressing  Note: Patient complaining of pain in right side of head. Patient was given Tylenol for the pain. Patient rated the pain a 3 and when re-assessed rated the pain a 1. Will continue to monitor. Problem: Safety - Adult  Goal: Free from fall injury  7/22/2022 0112 by Darion Mendez RN  Outcome: Progressing  Flowsheets (Taken 7/21/2022 2015)  Free From Fall Injury: Instruct family/caregiver on patient safety  7/21/2022 1141 by Maureen Ballard RN  Outcome: Progressing  Note: Call light in reach, bed in lowest position, and bed alarm activated. Education given on use of call light before ambulation and when in need of assistance. Patient expressed understanding.   Hourly visual checks performed and charted. Toileting offered to patient. No falls this shift, at any time. Arm band and falling star in place. Will continue to monitor. Problem: Neurosensory - Adult  Goal: Achieves stable or improved neurological status  7/22/2022 0112 by Teresa Del Toro RN  Outcome: Progressing  Flowsheets  Taken 7/22/2022 0112  Achieves stable or improved neurological status:   Assess for and report changes in neurological status   Initiate measures to prevent increased intracranial pressure   Monitor temperature, glucose, and sodium. Initiate appropriate interventions as ordered  Taken 7/21/2022 2010  Achieves stable or improved neurological status: Assess for and report changes in neurological status  7/21/2022 1141 by Emerson Flaherty RN  Outcome: Progressing  Note: Patient alert/oriented to person, place, time and situation. Patient able to express needs/concerns. NIH 0. No signs of aphasia/dsarthria at this time. Responding to commands appropriately. No neurological deficits noted. Neuro checks at least every shift. Will continue to monitor and assess. Goal: Absence of seizures  Recent Flowsheet Documentation  Taken 7/21/2022 2010 by Teresa Del Toro RN  Absence of seizures: Monitor for seizure activity.   If seizure occurs, document type and location of movements and any associated apnea  7/21/2022 1141 by Emerson Flaherty RN  Outcome: Progressing  Goal: Remains free of injury related to seizures activity  Recent Flowsheet Documentation  Taken 7/21/2022 2010 by Teresa Del Toro RN  Remains free of injury related to seizure activity: Maintain airway, patient safety  and administer oxygen as ordered  7/21/2022 1141 by Emerson Flaherty RN  Outcome: Progressing  Goal: Achieves maximal functionality and self care  Recent Flowsheet Documentation  Taken 7/21/2022 2010 by Teresa Del Toro RN  Achieves maximal functionality and self care: Monitor swallowing and airway patency with patient fatigue and changes in neurological status  7/21/2022 1141 by Hannah Eng RN  Outcome: Progressing     Problem: Skin/Tissue Integrity - Adult  Goal: Skin integrity remains intact  7/22/2022 0112 by Vy Mckeon RN  Flowsheets  Taken 7/21/2022 2015  Skin Integrity Remains Intact: Monitor for areas of redness and/or skin breakdown  Taken 7/21/2022 2010  Skin Integrity Remains Intact: Monitor for areas of redness and/or skin breakdown  7/21/2022 1141 by Hannah Eng RN  Outcome: Progressing  Flowsheets (Taken 7/21/2022 0914)  Skin Integrity Remains Intact: Monitor for areas of redness and/or skin breakdown  Note: No signs of skin breakdown. Skin warm, dry, and intact. Mucous membranes pink and moist.  Assistance with turns/ambulation provided PRN. Will continue to monitor. Problem: Confusion  Goal: Confusion, delirium, dementia, or psychosis is improved or at baseline  Description: INTERVENTIONS:  1. Assess for possible contributors to thought disturbance, including medications, impaired vision or hearing, underlying metabolic abnormalities, dehydration, psychiatric diagnoses, and notify attending LIP  2. [place] high risk fall precautions, as indicated  3. Provide frequent short contacts to provide reality reorientation, refocusing and direction  4. Decrease environmental stimuli, including noise as appropriate  5. Monitor and intervene to maintain adequate nutrition, hydration, elimination, sleep and activity  6. If unable to ensure safety without constant attention obtain sitter and review sitter guidelines with assigned personnel  7. Initiate Psychosocial CNS and Spiritual Care consult, as indicated  7/21/2022 1141 by Hannah Eng RN  Outcome: Progressing  Note: Patient alert/oriented x4. No signs of confusion noted this shift. Problem: Abuse/Neglect  Goal: Pt/Caregiver aware of resources to assist with issues of abuse and neglect  Description: INTERVENTIONS:  1.  Assess for level of risk and safety  2. Initiate referral to Social Work and notify Licensed Independent Practictioner ([facility])  3. Provide appropriate education and resources to patient and/or family  4. Initiate referral to Adult MISHA Arroyo, as appropriate  5. Initiate referral to CARMEN Rosis, as appropriate  6. Offer to have the patient's the patient's chart marked as Non-disclosed/Privacy patient for phone inquiries, as appropriate  7. Provide emotional support, including active listening and acknowledgment of concerns  7/21/2022 1141 by Rainer Young RN  Outcome: Progressing  Note: Will continue to assess. Problem: ABCDS Injury Assessment  Goal: Absence of physical injury  7/22/2022 0112 by Benton Lopez RN  Outcome: Progressing  Flowsheets (Taken 7/21/2022 2015)  Absence of Physical Injury: Implement safety measures based on patient assessment  7/21/2022 1141 by Rainer Young RN  Outcome: Progressing     Problem: Skin/Tissue Integrity  Goal: Absence of new skin breakdown  Description: 1. Monitor for areas of redness and/or skin breakdown  2. Assess vascular access sites hourly  3. Every 4-6 hours minimum:  Change oxygen saturation probe site  4. Every 4-6 hours:  If on nasal continuous positive airway pressure, respiratory therapy assess nares and determine need for appliance change or resting period. 7/22/2022 0112 by Benton Lopez RN  Outcome: Progressing  Note: No signs of injury at this time and will continue to monitor q1h and as needed.     7/21/2022 1141 by Rainer Young RN  Outcome: Progressing     Problem: Skin/Tissue Integrity - Adult  Goal: Incisions, wounds, or drain sites healing without S/S of infection  Recent Flowsheet Documentation  Taken 7/21/2022 2015 by Benton Lopez RN  Incisions, Wounds, or Drain Sites Healing Without Sign and Symptoms of Infection: ADMISSION and DAILY: Assess and document risk factors for pressure ulcer development  Taken 7/21/2022 2010 by Geno Warner RN  Incisions, Wounds, or Drain Sites Healing Without Sign and Symptoms of Infection: ADMISSION and DAILY: Assess and document risk factors for pressure ulcer development  Goal: Oral mucous membranes remain intact  Recent Flowsheet Documentation  Taken 7/21/2022 2015 by Geno Warner RN  Oral Mucous Membranes Remain Intact: Assess oral mucosa and hygiene practices  Taken 7/21/2022 2010 by Geno Warner RN  Oral Mucous Membranes Remain Intact: Assess oral mucosa and hygiene practices

## 2022-07-25 NOTE — PROGRESS NOTES
CLINICAL PHARMACY NOTE: MEDS TO BEDS    Total # of Prescriptions Filled: 3   The following medications were delivered to the patient:  Clearlax 17gm powder  Aspirin 81mg  Lisinopril 10mg    Additional Documentation:

## 2022-08-11 ENCOUNTER — OFFICE VISIT (OUTPATIENT)
Dept: NEUROSURGERY | Age: 57
End: 2022-08-11
Payer: MEDICAID

## 2022-08-11 VITALS
HEIGHT: 63 IN | WEIGHT: 148 LBS | DIASTOLIC BLOOD PRESSURE: 82 MMHG | BODY MASS INDEX: 26.22 KG/M2 | SYSTOLIC BLOOD PRESSURE: 124 MMHG

## 2022-08-11 DIAGNOSIS — M54.12 CERVICAL RADICULOPATHY: Primary | ICD-10-CM

## 2022-08-11 PROCEDURE — G8427 DOCREV CUR MEDS BY ELIG CLIN: HCPCS | Performed by: PHYSICIAN ASSISTANT

## 2022-08-11 PROCEDURE — G8419 CALC BMI OUT NRM PARAM NOF/U: HCPCS | Performed by: PHYSICIAN ASSISTANT

## 2022-08-11 PROCEDURE — 99213 OFFICE O/P EST LOW 20 MIN: CPT | Performed by: PHYSICIAN ASSISTANT

## 2022-08-11 PROCEDURE — 1111F DSCHRG MED/CURRENT MED MERGE: CPT | Performed by: PHYSICIAN ASSISTANT

## 2022-08-11 PROCEDURE — 4004F PT TOBACCO SCREEN RCVD TLK: CPT | Performed by: PHYSICIAN ASSISTANT

## 2022-08-11 PROCEDURE — 3017F COLORECTAL CA SCREEN DOC REV: CPT | Performed by: PHYSICIAN ASSISTANT

## 2022-08-11 NOTE — PROGRESS NOTES
[id number] [address] [facility]  Dept: [phone]  Dept Fax: [phone]  Loc: [facility] Follow Visit  Visit Date: 8/11/2022      Bobby Leiva  is a 64 y.o. female who is returning to the office today for a post-op follow-up visit. She is a surgical history for prior anterior cervical decompression and instrumented fusion of C5-6 performed at [facility] in 2004 with recent cervical MRI highlighting a grade 1 anterior listhesis and 4 mm central disc protrusion at C3-4 (the level above her prior fusion) with moderate mass-effect on the thecal sac and mass-effect on the anterior aspect of the cervical spinal cord. She was last seen and evaluated as an inpatient at [facility] on 7/21/2022 by myself and by Dr. Brianne Cooper. He presents today for her initial post discharge hospital follow-up from that visit for additional evaluation. She arrives today unaccompanied and walking without assistance. She relates continuing cervical discomfort with upper extremity weakness and over the last week has experienced some issues with imbalance. She has requested a cane for ambulation. An additional complaint involves transient headaches and muscle pain radiating to the shoulder blades. Her discharge from the hospital she has been treating her pain with over-the-counter Tylenol which provides very little relief. MRI and CT scans were reviewed with the patient with discussions centering around adjacent level disease most prominent below and above her prior fusion with a grade 1 anterior listhesis and 4 mm central disc protrusion at C3-4 secondary to this process. A discussion was had involving possible surgical intervention in the form of a reexploration of her anterior cervical fusion for extension of her fusion to the levels above and below to address adjacent level wear and tear.   We also discussed the possibility of a posterior approach. We discussed those procedures in some detail along with expectations for recovery and associated risks which include, but not limited to; bleeding, infection, anesthetic complication, neurologic injury, dysphagia and hoarseness, incomplete relief of symptoms, need for future fusion and even death. Prior to consenting or scheduling for possible surgical intervention, a flexion-extension x-ray of the cervical spine is ordered to ascertain any significant instability. A referral to pain management for evaluation for injection therapies and for pain control is also initiated at today's visit with a follow-up appoint with our service scheduled in approximately 2 weeks with myself and with Dr. Teressa Tom to further discuss the surgical option. Patient was evaluated today and is doing marginally overall. No new complaints were voiced. Patient  lives alone  Wound: none  Follow-up Studies: No orders of the defined types were placed in this encounter. Assessment/Plan:  Status Post cervical spine evaluation with adjacent level disease consideration for  Doing marginally overall  Encouraged gradual increase in physical and mental activity. Fall precaution and home safety education provided to patient. Follow-up: With findings on exam and on imaging significant for adjacent level disease above and below her prior anterior cervical fusion we feel it is reasonable to consider surgical intervention in the form of an anterior cervical decompression and fusion extension versus a posterior approach. Discussed both procedures in detail. Before considering either as a reasonable option, we feel it is reasonable to offer additional studies in the form of flexion-extension cervical spine x-ray and a referral to pain management for evaluation for injection therapies and pain control. A return to office follow-up appointment in approximately 2 weeks is also scheduled.   Eugenio García for ambulation and and orders been processed accordingly.       Electronically signed by Curly Garcia PA-C on 8/11/22 at 11:42 AM EDT

## 2022-08-15 ENCOUNTER — TELEPHONE (OUTPATIENT)
Dept: NEUROSURGERY | Age: 57
End: 2022-08-15

## 2022-08-15 NOTE — TELEPHONE ENCOUNTER
During neuro surgical service and are not currently seeing this patient for stroke related symptoms or sequela. Our visit with this patient centered on adjacent level cervical spinal disease with consideration for possible surgical intervention in the form of an extension of her prior cervical fusion at a future date. Any stroke related concerns need to be addressed by a neurologist.  The best of my knowledge we did not discuss her prior stroke or stroke related symptoms therefore no device or direction was given or implied.

## 2022-08-15 NOTE — TELEPHONE ENCOUNTER
Pt called stating she needs a letter or document for court stating she is capable of taking care of her grandson after having a stroke. The court is stating that she may not be capable of this task after having a stroke that may have caused an altered mental status. Please advise.

## 2022-08-18 PROBLEM — G43.009 ATYPICAL MIGRAINE: Status: ACTIVE | Noted: 2022-08-18

## 2022-08-18 NOTE — TELEPHONE ENCOUNTER
Kalani Guerrero from children services called requesting information in regards to patient's previous request. I informed Ruddy Roldan, per HUGO Adams pt needs to request this information from a neurologist as we are not seeing pt for stroke related symptoms and have not given pt any advice concerning stoke related symptoms. Ruddy Roldan expressed understanding and stated he would follow up with the patient.

## 2022-08-22 ENCOUNTER — TELEPHONE (OUTPATIENT)
Dept: NEUROSURGERY | Age: 57
End: 2022-08-22

## 2022-09-09 ENCOUNTER — OFFICE VISIT (OUTPATIENT)
Dept: NEUROSURGERY | Age: 57
End: 2022-09-09
Payer: MEDICAID

## 2022-09-09 VITALS
SYSTOLIC BLOOD PRESSURE: 110 MMHG | HEIGHT: 63 IN | DIASTOLIC BLOOD PRESSURE: 70 MMHG | BODY MASS INDEX: 26.22 KG/M2 | WEIGHT: 148 LBS

## 2022-09-09 DIAGNOSIS — M54.12 CERVICAL RADICULOPATHY: Primary | ICD-10-CM

## 2022-09-09 PROCEDURE — 99213 OFFICE O/P EST LOW 20 MIN: CPT | Performed by: PHYSICIAN ASSISTANT

## 2022-09-09 PROCEDURE — G8419 CALC BMI OUT NRM PARAM NOF/U: HCPCS | Performed by: PHYSICIAN ASSISTANT

## 2022-09-09 PROCEDURE — G8427 DOCREV CUR MEDS BY ELIG CLIN: HCPCS | Performed by: PHYSICIAN ASSISTANT

## 2022-09-09 PROCEDURE — 3017F COLORECTAL CA SCREEN DOC REV: CPT | Performed by: PHYSICIAN ASSISTANT

## 2022-09-09 PROCEDURE — 4004F PT TOBACCO SCREEN RCVD TLK: CPT | Performed by: PHYSICIAN ASSISTANT

## 2022-09-09 RX ORDER — CYCLOBENZAPRINE HCL 5 MG
TABLET ORAL
COMMUNITY
Start: 2022-08-26

## 2022-09-09 NOTE — PROGRESS NOTES
[address] [place] [address]  Dept: [phone]  Dept Fax: [phone]  Loc: [facility] Follow Visit  Visit Date: 9/9/2022      Nichelle Forrester  is a 64 y.o. female who is returning to the office today for a post-discharge/hospital follow-up visit to address neck pain evidenced on recent admission. Patient was last seen and evaluated by Dr. Paddy Mena while an inpatient on 8/18/2022. At the time of that evaluation it was determined the patient's symptoms, though consistent with cervical spine myelopathy, findings could not explain all the patient's neurological symptoms. Britany management per primary was recommended with a follow-up with our service today for reevaluation. Of note: Patient underwent an x-ray of the cervical spine on 8/30/2022 which was considered stable. An MRI of the cervical spine imaged without contrast on 7/20/2021 was consistent with some adjacent level deterioration with a grade 1 anterior listhesis at C3-4 noted. She arrives today by her  and is comfortable with pain well to moderately controlled. Does continue with complaints of pain radiating to between the shoulder blades but was absent significant cervical pain on exam today. We reviewed the flexion-extension x-rays that she arrived with that revealed no abnormal instability. She relates an upcoming appoint with pain management for evaluation for injection therapy on the 14th and is encouraged to keep and maintain that appointment. On exam she remained stable and intact neurologically to her baseline with no additional significant changes noted. We have agreed to follow-up with her after being evaluated and treated by pain management to ascertain any improvements from those modalities. As she wishes to avoid surgery if possible we feel the conservative approach with pain management is reasonable.   She is very happy with today's appointment having question concerns addressed and answered and looks forward to her next follow-up appointment in approximately 2 weeks. Patient was evaluated today and is doing adequately overall. No new complaints were voiced. Patient  lives with their family  Wound: none  Follow-up Studies: No orders of the defined types were placed in this encounter. Assessment/Plan:  Status Post ongoing evaluation of radiating neck pain  Doing adequately overall  Encouraged gradual increase in physical and mental activity. Fall precaution and home safety education provided to patient. Follow-up: Follow-up appointment in approximately 2 weeks pursuant to evaluation and treatment by pain management for possible injection therapy.       Electronically signed by Savage Michaels PA-C on 9/9/22 at 10:28 AM EDT

## 2022-09-14 ENCOUNTER — OFFICE VISIT (OUTPATIENT)
Dept: PHYSICAL MEDICINE AND REHAB | Age: 57
End: 2022-09-14
Payer: MEDICAID

## 2022-09-14 VITALS
SYSTOLIC BLOOD PRESSURE: 110 MMHG | BODY MASS INDEX: 26.22 KG/M2 | HEART RATE: 74 BPM | DIASTOLIC BLOOD PRESSURE: 68 MMHG | WEIGHT: 148 LBS | HEIGHT: 63 IN

## 2022-09-14 DIAGNOSIS — M47.812 FACET HYPERTROPHY OF CERVICAL REGION: ICD-10-CM

## 2022-09-14 DIAGNOSIS — M47.812 CERVICAL SPONDYLOSIS: ICD-10-CM

## 2022-09-14 DIAGNOSIS — M48.061 SPINAL STENOSIS OF LUMBAR REGION WITHOUT NEUROGENIC CLAUDICATION: ICD-10-CM

## 2022-09-14 DIAGNOSIS — M79.18 MYOFASCIAL PAIN: Primary | ICD-10-CM

## 2022-09-14 DIAGNOSIS — G89.4 CHRONIC PAIN SYNDROME: ICD-10-CM

## 2022-09-14 DIAGNOSIS — M47.816 LUMBAR SPONDYLOSIS: ICD-10-CM

## 2022-09-14 DIAGNOSIS — M54.16 LUMBAR RADICULOPATHY: ICD-10-CM

## 2022-09-14 DIAGNOSIS — M47.816 FACET HYPERTROPHY OF LUMBAR REGION: ICD-10-CM

## 2022-09-14 DIAGNOSIS — M48.02 CERVICAL SPINAL STENOSIS: ICD-10-CM

## 2022-09-14 PROBLEM — R01.1 HEART MURMUR, SYSTOLIC: Status: ACTIVE | Noted: 2021-01-08

## 2022-09-14 PROBLEM — I10 ESSENTIAL HYPERTENSION: Status: ACTIVE | Noted: 2020-11-20

## 2022-09-14 PROBLEM — I51.7 LVH (LEFT VENTRICULAR HYPERTROPHY): Status: ACTIVE | Noted: 2020-11-20

## 2022-09-14 PROBLEM — J44.9 COPD (CHRONIC OBSTRUCTIVE PULMONARY DISEASE) (HCC): Status: ACTIVE | Noted: 2018-11-13

## 2022-09-14 PROBLEM — Z95.5 HISTORY OF CORONARY ARTERY STENT PLACEMENT: Status: ACTIVE | Noted: 2018-02-07

## 2022-09-14 PROBLEM — I21.9 MI (MYOCARDIAL INFARCTION) (HCC): Status: ACTIVE | Noted: 2022-09-14

## 2022-09-14 PROCEDURE — 99204 OFFICE O/P NEW MOD 45 MIN: CPT | Performed by: NURSE PRACTITIONER

## 2022-09-14 PROCEDURE — 3017F COLORECTAL CA SCREEN DOC REV: CPT | Performed by: NURSE PRACTITIONER

## 2022-09-14 PROCEDURE — G8427 DOCREV CUR MEDS BY ELIG CLIN: HCPCS | Performed by: NURSE PRACTITIONER

## 2022-09-14 PROCEDURE — 4004F PT TOBACCO SCREEN RCVD TLK: CPT | Performed by: NURSE PRACTITIONER

## 2022-09-14 PROCEDURE — G8419 CALC BMI OUT NRM PARAM NOF/U: HCPCS | Performed by: NURSE PRACTITIONER

## 2022-09-14 PROCEDURE — 20553 NJX 1/MLT TRIGGER POINTS 3/>: CPT | Performed by: NURSE PRACTITIONER

## 2022-09-14 RX ORDER — BACLOFEN 10 MG/1
10 TABLET ORAL NIGHTLY PRN
Qty: 30 TABLET | Refills: 0 | Status: SHIPPED | OUTPATIENT
Start: 2022-09-14 | End: 2022-10-14

## 2022-09-14 RX ORDER — METHOCARBAMOL 100 MG/ML
100 INJECTION, SOLUTION INTRAMUSCULAR; INTRAVENOUS ONCE
Status: SHIPPED | OUTPATIENT
Start: 2022-09-14

## 2022-09-14 NOTE — PROGRESS NOTES
[facility] [address]  Dept: [phone]  Dept Fax: [phone]: [phone]    Visit Date: 9/14/2022    Leandro Lee is a 64 y.o. female who is referred for pain management evaluation and treatment per Dr. Chato Alfaro. CAGE and [employer] Questions   1. In the last three months, have you felt you should cut down or stop drinking or using drugs? Yes []        No [x]     2. In the last three months, has anyone annoyed you or gotten on your nerves by telling you to cut down or stop drinking or using drugs? Yes []        No [x]     3. In the last three months, have you felt guilty or bad about how much you drink or use drugs? Yes []        No [x]     4. In the last three months, have you been waking up wanting to have an alcoholic drink or use drugs? Yes []        No [x]        Opioid Risk Tool:  Clinician Form       1. Family History of Substance Abuse: Female Male    Alcohol   []1   []3    Illegal drugs   []2   []3    Prescription drugs     []4   []4   2. Personal History of Substance Abuse:          Alcohol   []3   []3    Illegal drugs   []4   []4    Prescription drugs     []5   []5   3. Age (talia box if between 12 and 39):     []1   []1   4. History of Preadolescent Sexual Abuse:     []3   []0   5. Psychological Disease:      Attention deficit disorder, obsessive-compulsive disorder, bipolar, schizophrenia   []2   []2      Depression     [x]1   []1    Scoring Totals 1      Total Score  Low Risk  Moderate Risk  High Risk   Risk Category   0 - 3   4 - 7   8 or Above      Patient states symptoms interfere with:  A.  General Activity:  yes   B. Mood: no    C. Walking Ability:   no   D. Normal Work (Includes both work outside the home and housework):   yes    E.  Relations with Other People:  no   F. Sleep:   yes   G.  Enjoyment of Life:  yes       Chronic Pain/PM&R Clinic Note     Encounter Date: 9/14/22    Subjective:   Chief Complaint:   Chief Complaint   Patient presents with    New Patient     Cervical pain       History of Present Illness:   Jimenez Abrams is a 64 y.o. female seen in the clinic initially on 09/14/2022 upon request from Chelsey Duke, [address] for her history of neck pain. She has a personal medical history of C5-6 fusion 2002, hypertension, CAD, COPD, hyperlipidemia, stents placed, anticoagulation, heart failure. Patient presents today with complaints of left-sided neck pain that does go down into the shoulder, shoulder blade area and into the left-sided ribs. She reports this has been occurring for 1 year. She denies any accident, falls, accidents that caused her pain at that time. She states it started out of nowhere and just continue to worsen over the last year. She describes the pain as numbness, jabbing, burning, tingling, shooting pain. She states it feels \"as though someone is ripping my body apart\". She states pain is worse at night, laying down, lifting, moving her head. She states she takes hot baths with some relief tried ice with no relief. She has not done any formal physical therapy. She states she tries to stretch at home but does not do any home exercises. She denies any weakness, dropping things. She does have a history of migraines, states she feels like she gets headaches with this pain that does wrap around the whole head. She also reports she has complaints of left-sided low back pain that does go into the buttocks and hip, that does go down her left leg to her toes. She states it is a constant pain, that has been occurring for 1 month. She denies any accident, injury, falls that caused the pain at that time. She describes it as a pressure, squeezing, muscle cramp. She denies any numbness, tingling, loss of bowel or bladder. She has not had any formal physical therapy for this.   She states standing too long, walking makes it worse. Hot baths and stretching help. Pain scale : at worst pain is 10/10, at best pain is 3/10. History of Interventions:   Surgery: C5-6 FUSION with Dr Kris Dixon   Injections: None    Current Treatment Medications:   Ibuprofen- eases the pain      Historical Treatment Medications:   Norco 5/325- relief   Flexeril- relief  Prednisone- relief   Voltaren- relief     Imagin2022 CT CERVICAL   PROCEDURE: CT CERVICAL SPINE WO CONTRAST       CLINICAL INFORMATION: rule out significant adjacent level disease. Neck pain for 2 weeks. COMPARISON: MRI cervical spine dated 2022. TECHNIQUE: 3 mm noncontrast axial images were obtained through the cervical spine with sagittal and coronal reconstructions. All CT scans at this facility use dose modulation, iterative reconstruction, and/or weight-based dosing when appropriate to reduce radiation dose to as low as reasonably achievable. FINDINGS:   Redemonstration of fusion of C5-6 with anterior screw-plate fixation bridging this level. There is no evidence of hardware failure or loosening. There is associated straightening of the cervical lordosis. There is minimal, grade 1, anterolisthesis of C2    relative to C3 and C3 relative to C4 on the basis of degenerative change, stable compared to prior MRI. There is disc space narrowing throughout the nonfused levels of the cervical spine, similar to prior MRI. No fracture of the cervical vertebral column    is identified. The craniocervical junction appears intact. No paraspinal or epidural fluid collection is identified. Paraspinal soft tissues are grossly unremarkable. At C2-3 there is no significant spinal canal or neuroforaminal stenosis. At C3-4 there is partial uncovering the disc with superimposed shallow disc bulge without significant spinal canal stenosis. There is moderate left neural frontal stenosis in association with facet hypertrophy.    At C4-5 there is a shallow disc bulge which causes mild spinal canal stenosis. There is mild left and moderate right neuroforaminal stenosis in association with uncovertebral joint degenerative change. At C5-6 there is no significant spinal canal or neuroforaminal stenosis. At C6-7 there is a shallow disc bulge without significant spinal canal stenosis. There is mild bilateral foraminal stenosis in association with mild uncovertebral joint degenerative change. At C7-T1 there is no significant spinal canal or neuroforaminal stenosis. Impression       1. Redemonstration of ACDF bridging C5-6 without evidence of acute abnormality. 2. Multilevel degenerative changes of the cervical spine with areas of up to mild spinal canal stenosis and moderate neural foraminal stenosis. Mri cervical 7/20/2022  MR cervical spine without gadolinium       Comparison: None       Findings:   Visualized intracranial contents are unremarkable. No cervical fluid collections or masses. Cervical cord normal.       Status post anterior metallic and interbody fusion at C5-C6. Appropriate    alignment at the surgical level. No evidence of hardware failure. Grade 1    anterolisthesis of C3 on C4. Remaining cervical alignment unremarkable. C2-C3: Partial disc desiccation. No disc bulge or herniation. No central    canal or neural foraminal narrowing. C3-C4: Grade 1 anterolisthesis. 4 mm central disc protrusion with moderate    mass-effect on the thecal sac and mild mass-effect on the anterior aspect    of the cord. Moderate osteoarthritis of the left facet joint. Moderate    narrowing of the left neural foramen. No significant narrowing of the    right neural foramen. C4-C5: Small disc osteophyte complex with moderate thecal sac effacement. No significant neural foraminal narrowing. C5-C6: Near complete ankylosis of the vertebral bodies. Bony proliferation    at the posterior margin of the disc with mild thecal sac effacement.  No significant neural foraminal narrowing. C6-C7: Disc osteophyte complex. No significant central canal narrowing. Mild stenosis of bilateral neural foramina. C7-T1: Normal           Impression   1. Status post anterior metallic and interbody fusion at C5-C6. Unremarkable postoperative appearance. 2. Grade 1 anterolisthesis and 4 mm central disc protrusion at C3-C4 with    moderate mass-effect on the thecal sac and mild mass-effect on the    anterior aspect of the cervical spinal cord. 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 LUMBAR MRI  7/20/2022  MR lumbar spine without gadolinium       Comparison: None       Findings:   No scoliosis or spondylolisthesis. No acute fracture or pathologic bone lesion. Possible chronic pars defects    at L5. Small hemangiomas incidentally noted. The conus is at the T12 level. Paraspinous musculature intact. L1-L2: Mild broad-based disc bulge with minimal thecal sac effacement. No    significant neural foraminal narrowing. L2-L3: Minimal broad-based disc bulge. No significant central canal or    neural foraminal narrowing. L3-L4: Mild broad-based disc bulge with minimal thecal sac effacement. Mild facet osteoarthritis. No significant neural foraminal narrowing. L4-L5: 3 mm central disc protrusion with mild thecal sac effacement. Mild    facet osteoarthritis. Mild bilateral neural foraminal narrowing. L5-S1: 2 mm central disc protrusion with mild thecal sac effacement. Mild    facet osteoarthritis. Mild bilateral neural foraminal narrowing. Impression   1. Possible chronic pars defects at L5. No associated alignment    abnormality. 2. Tiny central disc protrusions at L4-L5 and L5-S1 with mild thecal sac    effacement. 3. Additional chronic findings as above.      Past Medical History:   Diagnosis Date    CAD (coronary artery disease) COPD (chronic obstructive pulmonary disease) ([facility] [address]     Hyperlipidemia     Hypertension        Past Surgical History:   Procedure Laterality Date    BREAST SURGERY      CARDIAC SURGERY         History reviewed. No pertinent family history. Medications & Allergies:   Current Outpatient Medications   Medication Instructions    amLODIPine (NORVASC) 10 mg, Oral, 2 TIMES DAILY    aspirin 81 mg, Oral, DAILY    atorvastatin (LIPITOR) 80 mg, Oral, DAILY    baclofen (LIORESAL) 10 mg, Oral, NIGHTLY PRN    carvedilol (COREG) 25 mg, Oral, 2 TIMES DAILY WITH MEALS    clopidogrel (PLAVIX) 75 mg, Oral, DAILY    cyclobenzaprine (FLEXERIL) 5 MG tablet TAKE 1 TABLET BY MOUTH THREE TIMES DAILY    diclofenac sodium (VOLTAREN) 4 g, Topical, PRN    hydrALAZINE (APRESOLINE) 25 mg, Oral, 2 times daily    lisinopril (PRINIVIL;ZESTRIL) 10 mg, Oral, DAILY    nitroGLYCERIN (NITROSTAT) 0.3 mg, SubLINGual, EVERY 5 MIN PRN, up to max of 3 total doses. If no relief after 1 dose, call 911.    spironolactone (ALDACTONE) 50 mg, Oral, DAILY    SUMAtriptan (IMITREX) 50 mg, Oral, ONCE PRN       No Known Allergies    Review of Systems:   Constitutional: negative for weight changes or fevers  Genitourinary: negative for bowel/bladder incontinence   Musculoskeletal: positive for left-sided neck pain, left-sided low back and left leg pain  Neurological: negative for any leg weakness or numbness/tingling  Behavioral/Psych: negative for anxiety/depression   All other systems reviewed and are negative    Objective:     Vitals:    09/14/22 1059   BP: 110/68   Pulse: 74       Constitutional: Pleasant, no acute distress   Head: Normocephalic, atraumatic   Eyes: Conjunctivae normal   Neck: Supple, symmetrical   Respiration: Non-labored breathing   Cardiovascular: Limbs warm and well perfused   Musculoskeletal: decreased cervical ROM. Negative Spurling maneuver bilaterally. increased pain with facet loading.  tenderness to palpation in left cervical paraspinals and other posterior neck musculature. Neuro: Alert, oriented. CN II-XII appear grossly intact. Motor strength 5/5 SAb, EF, EE, WE, Salvatore. LT sensation intact in upper limbs. Skin: no skin rashes or lesions noted   Psychological: Cooperative, no exaggerated pain behaviors      Musculoskeletal: Muscle bulk symmetric, no atrophy, no gross deformities   · Lower Extremities: ROM WNL. · Thorax: No paraspinal tenderness bilaterally. No scoliosis or kyphosis. · Lumbar Spine: ROM reduced. Lumbar paraspinals tender to palpation left side. SLR positive left. LYLE pos left side. GAENSLEN pos left side. Positive facet loading left. Bilateral greater trochanters non-tender to palpation. Neurological: Cranial nerves II-XII grossly intact. · Gait - Normal, non-antalgic gait. Ambulates without assistive device. · Motor: 5/5 muscle strength in bilateral hip flexion, knee flexion, knee extension, ankle dorsiflexion, and ankle plantar flexion   · Sensory: LT sensation intact in lower limbs   · Reflexes: 2+ symmetrical in bilateral achilles, 2+ bilateral patellar  Skin: No rashes or lesions present   Psychological: Cooperative, no exaggerated pain behaviors       Assessment:    Diagnosis Orders   1. Myofascial pain  Ambulatory referral to Physical Therapy      2. Cervical spondylosis  Ambulatory referral to Physical Therapy      3. Facet hypertrophy of cervical region  Ambulatory referral to Physical Therapy      4. Cervical spinal stenosis  Ambulatory referral to Physical Therapy      5. Lumbar radiculopathy  Ambulatory referral to Physical Therapy      6. Spinal stenosis of lumbar region without neurogenic claudication  Ambulatory referral to Physical Therapy      7. Facet hypertrophy of lumbar region  Ambulatory referral to Physical Therapy      8. Lumbar spondylosis  Ambulatory referral to Physical Therapy      9.  Chronic pain syndrome  Ambulatory referral to Physical Therapy           Derik Nevarez is a 64 y.o.female presenting to the pain clinic for evaluation of neck pain, with complaints of left-sided low back and leg pain. Discussed possible injections to help with pain control in the future such as cervical facet series at left C3-4, C4-5, cervical epidural steroid injections, trigger point injections, TFLESI @L5-S1, lumbar facet series @ L4-5,L5-S1. We will start with trigger point injections into the left-sided cervical area, see note below. I also started her on baclofen nightly, Voltaren as needed, and ordered formal physical therapy with dry needling. I will see her back in 10 to 12 weeks after therapy to reevaluate. Plan: The following treatment recommendations and plan were discussed in detail with Shaina Malagon. Imaging:   I have reviewed patients imaging of cervical XR, Lumbar XR, Cervical MRI, CT, lumbar MRI and results were discussed with patient today. Analgesics:   Patient is taking Ibuprofen. Patient is advised to take as prescribed and not take on an empty stomach. Adjuvants: For continued chronic pain with associated musculoskeletal component, the patient is advised to start Baclofen 10 mg nightly PRN, Voltaren gel PRN    Interventions: With examination consistent with myofascial pain, trigger point injections were discussed and completed today. See attached note    Anticoagulation/NPO Recommendations:   None    Multidisciplinary Pain Management:   In the presence of complex, chronic, and multi-factorial pain, the importance of a multidisciplinary approach to pain management in the patients management regimen was emphasized and discussed in great detail. PHYSICAL THERAPY: Patient is advised to see a physical therapist for gentle stretching exercises and conditioning exercises for management of pain.    PSYCHOLOGY: Patient is advised to see a clinical pain psychologist, for the psychosocial aspect of pain care through coping skills, relaxation strategies, cognitive group therapy etc.   OBESITY: Patient is advised to seek nutrition consult to help in managing their weight to decrease its impact on pain. SMOKING: Impact of smoking in patient's pain was discussed and patient is counseled against smoking. The patient was also advised to continue physical therapy and stretching exercises at home and cognitive behavioral and/or group therapy. Referrals:  Physical therapy- [address] [place] [address]    Prescriptions Written This Visit:   Baclfoen 10 mg nightly PRN  Voltaren gel PRN    Follow-up:   10-12 weeks after therapy    It was my pleasure to evaluate Vandana Davey today. I spent over 55 minutes evaluating this patient, reviewing previous notes and images and completing documentation. Swapnil García, QUEENIE - CNP   Interventional Pain Management/PM&R   [facility]     Procedure  Visit Date: 9/14/22    Vandana Davey is a 64 y.o. female presents today in the office for the following:  Chief Complaint   Patient presents with    New Patient     Cervical pain       History of Present Illness   Latasha Magdaleno is here today for trigger point injections. Pre-Op Diagnosis   Myofacial pain syndrome     Post-Op Diagnosis   Myofacial pain syndrome     Procedure: Trigger point injection(s)    Procedure Documentation   Patient identified. Consent signed. Site identified. Trigger points were identified in the left side cervical paraspinals, left trapezius, left thoracic paraspinal for a total of 10 trigger point injections.  Then with a 25g needle entered each trigger point and after negative aspiration, 1 cc of a mixture containing 1:10 - 100 mg methocarbamol: 0.5% bupivacaine was injected at each trigger point for a total of 10 trigger points      Procedural Complications: None      Vitals:    09/14/22 1059   BP: 110/68   Site: Left Upper Arm   Position: Sitting   Cuff Size: Medium Adult   Pulse: 74   Weight: 148 lb (67.1 kg)   Height: 5' 3\" (1.6 m)       Conclusion   No complications encountered. Patient discharged stable condition. Patient told if any problems to call office or go to ER.     Orders Placed This Encounter   Medications    baclofen (LIORESAL) 10 MG tablet     Sig: Take 1 tablet by mouth nightly as needed (spasm/pain)     Dispense:  30 tablet     Refill:  0    methocarbamol (ROBAXIN) injection 100 mg    diclofenac sodium (VOLTAREN) 1 % GEL     Sig: Apply 4 g topically as needed for Pain     Dispense:  350 g     Refill:  2       Electronically signed by QUEENIE Keller CNP on 9/14/22 at 11:43 AM EDT

## 2022-09-22 ENCOUNTER — HOSPITAL ENCOUNTER (OUTPATIENT)
Dept: PHYSICAL THERAPY | Age: 57
Setting detail: THERAPIES SERIES
Discharge: HOME | End: 2022-09-22
Payer: MEDICAID

## 2022-09-22 PROCEDURE — 97162 PT EVAL MOD COMPLEX 30 MIN: CPT

## 2022-09-22 PROCEDURE — 97140 MANUAL THERAPY 1/> REGIONS: CPT

## 2022-09-22 NOTE — PROGRESS NOTES
** PLEASE SIGN, DATE AND TIME CERTIFICATION BELOW AND RETURN TO [facility] OUTPATIENT REHABILITATION (FAX #: [phone]). ATTEST/CO-SIGN IF ACCESSING VIA INIGIGI. THANK YOU.**    I certify that I have examined the patient below and determined that Physical Medicine and Rehabilitation service is necessary and that I approve the established plan of care for up to 90 days or as specifically noted.   Attestation, signature or co-signature of physician indicates approval of certification requirements.    ________________________ ____________ __________  Physician Signature   Date   Time  [facility]  PHYSICAL THERAPY  [x] EVALUATION  [] DAILY NOTE (LAND) [] DAILY NOTE (AQUATIC ) [] PROGRESS NOTE [] DISCHARGE NOTE    [x] OUTPATIENT REHABILITATION [facility]   [] [place] [address]    [] [facility]   [] [facility]    Date: 2022  Patient Name:  Selinda Curling  : 1965  MRN: [id number]  CSN: [id number]    Referring Practitioner REJI Be*   Diagnosis Myofascial pain [M79.18]  Cervical spondylosis [M47.812]  Facet hypertrophy of cervical region [M47.812]  Cervical spinal stenosis [M48.02]  Lumbar radiculopathy [M54.16]  Spinal stenosis of lumbar region without neurogenic claudication [M48.061]  Facet hypertrophy of lumbar region [M47.816]  Lumbar spondylosis [M47.816]  Chronic pain syndrome [G89.4]    Treatment Diagnosis Neck pain, ROM deficits, radiculopathy   Date of Evaluation 22    Additional Pertinent History MI, HTN, irregular heartbeat, vertigo, COPD, Anxiety disorder, stroke, cervical fusion      Functional Outcome Measure Used NDI   Functional Outcome Score 17/45 (Does not drive) ()       Insurance: Primary: Payor: [employer] Myrna Dao /  /  / ,   Secondary:    Authorization Information: OUTPATIENT BENEFITS:              DEDUCTIBLE:  n/a              OUT OF POCKET:  n/a               INSURANCE PAYS AT: 100% PATIENT RESPONSIBILITY AND/OR CO-PAY: n/a  SECONDARY INSURANCE COMPANY: NA.      INSURANCE THERAPY BENEFIT: No precert until after 29SZ visit. Visit Limit Based on Precert  AQUATIC THERAPY COVERED:   Yes  MODALITIES COVERED:  Yes except for Iontophoresis and Hot/Cold Packs. TELEHEALTH COVERED: Yes   Visit # 1, 1/10 for progress note   Visits Allowed: 30 visits before precert required   Recertification Date: 53/31/11   Physician Follow-Up: Pending progress   Physician Orders: Dry needling   History of Present Illness: Chapito Robison is a 64year old female with chief complaint of neck pain. Patient reports problem began in early 2000s after a fracture of her upper cervical spine. She reports consistent low level pain until she suffered a stroke in July of 2022. She has seen by pain management and neurosurgeon in regard to her neck. Patient has had imaging and a consult for surgery. Additional history involving this problem includes long standing fusion that has allegedly impacted her blood flow to the brain. Patient feels relief with laying down and use of medication (muscle relaxer). She notes worse pain with flexion. They rate their pain generally as 5/10 but 10/10 at worst. Patient has been limited with her day to day functioning, including care of her grandchild due to their issue. They are seeking therapy services for pain relief and further management as needed. SUBJECTIVE: See Above    Social/Functional History and Current Status:  Medications and Allergies have been reviewed and are listed on Medical History Questionnaire. Sorin Pena lives with significant other in a single story home with stairs and a handrail to enter.     Task Previous Current   ADLs  Independent Modified Independent   IADL's Independent Modified Independent   Ambulation Independent Modified Independent   Transfers Independent Modified Independent   Recreation Independent Independent   Community Integration Independent Independent Driving Does not drive Does not drive   Work On Disability  On Disability     OBJECTIVE:    Pain: 5/10   Palpation Tenderness throughout Left side of neck and into upper trap; also note tight musculature with some trigger points throughout bilateral neck (especially at [facility])   Observation    Posture General flexed with forward head       Range of Motion Limited at least 50% in all directions; mostly with stretching of tissues on Left side   Strength Cervical MMT grossly 4-/5 with pain; UE MMT grossly 4+/5   Coordination    Sensation WNL; notes intermittent numbness in distal finger tips   Bed Mobility    Transfers    Ambulation    Stairs    Balance    Special Tests          TREATMENT   Precautions: C5-C6 anterior fusion   Pain:     X in shaded column indicates activity completed today   Modalities Parameters/  Location  Notes                     Manual Therapy Time/Technique  Notes   Subocciptal release 10 minutes X Educated patient on positioning and possible trial at home               Exercise/Intervention   Notes                                                                                  Specific Interventions Next Treatment: Dry needling per physician order, neck ROM, soft tissue mobilization, general postural education/mechanics    Activity/Treatment Tolerance:  [x]  Patient tolerated treatment well  []  Patient limited by fatigue  []  Patient limited by pain   []  Patient limited by medical complications  []  Other:     Assessment: Patient presents with significant complaints of neck pain with and without movement. Patient reports radiating symptoms through her arm and leg which is usually worse in the evenings. Patient has history of cervical fusion from ~20 years ago and has no signs of failure via recent imaging. She does has some degeneration in the surrounding levels. Additionally, she has some avoidance behaviors due to concerns about pain with any neck movements.  Patient may benefit from rehabilitation services to address her deficits and restore proper functional movement patterns while reducing her pain. Body Structures/Functions/Activity Limitations: impaired balance, impaired coordination, impaired muscle tone, impaired ROM, impaired strength, pain, and abnormal posture  Prognosis: good    GOALS:  Patient Goal: To have less pain    Short Term Goals:   Time Frame: 4 weeks     -Patient will report decreased neck pain to less than or equal to 3/10 during therapy exercises in order to reduce use of modalities or pain medications.    -Patient will improve AROM of bilateral cervical rotation to at least 45 degrees in order to complete functional movements with less difficulty or hesitation.   -Patient will improve AROM of bilateral cervical lateral flexion to at least 30 degrees in order to complete functional movements with less difficulty or hesitation.   -Patient will MMT greater than or equal to  4+/5 in all cervical motions in order to improve mobility of neck. -Patient will demonstrate improved postural strength/awareness as evidenced by scapular retraction while sitting without cues. Long Term Goals:   Time Frame: 8 weeks    -Patient will report less than or equal to 1/10 pain in neck in order to improve quality of life.   -Patient will report improved quality of life as evidenced by NDI score less than or equal to 10/45.   -Patient will have no increased pain with pushing, pulling, and lifting activities in order to improve functional movement patterns.   -Patient will demonstrate understanding of HEP upon discharge in order to maintain good mobility upon discharge. Patient Education:   [x]  HEP/Education Completed: Plan of Care, Goals, Anatomy of cervical spine and effect from fusion  [address] Access Code:  []  No new Education completed  []  Reviewed Prior HEP      [x]  Patient verbalized and/or demonstrated understanding of education provided.   []  Patient unable to verbalize and/or demonstrate understanding of education provided. Will continue education. []  Barriers to learning:     PLAN:  Treatment Recommendations: Strengthening, Range of Motion, Neuromuscular Re-education, Manual Therapy - Soft Tissue Mobilization, Pain Management, Home Exercise Program, Patient Education, Integrative Dry Needling, and Modalities    [x]  Plan of care initiated. Plan to see patient 2 times per week for 8 weeks to address the treatment planned outlined above.   []  Continue with current plan of care  []  Modify plan of care as follows:    []  Hold pending physician visit  []  Discharge    Time In 1343   Time Out 1435   Timed Code Minutes: 10 min   Total Treatment Time: 52 min       Electronically Signed by: Rajiv Rothman, PT

## 2022-09-27 ENCOUNTER — HOSPITAL ENCOUNTER (OUTPATIENT)
Dept: PHYSICAL THERAPY | Age: 57
Setting detail: THERAPIES SERIES
Discharge: HOME | End: 2022-09-27
Payer: MEDICAID

## 2022-09-27 PROCEDURE — 97110 THERAPEUTIC EXERCISES: CPT

## 2022-09-27 PROCEDURE — 97140 MANUAL THERAPY 1/> REGIONS: CPT

## 2022-09-27 NOTE — PROGRESS NOTES
[address] [facility]  PHYSICAL THERAPY  [x] DAILY NOTE (LAND) [] DAILY NOTE (AQUATIC ) [] PROGRESS NOTE [] DISCHARGE NOTE    [x] OUTPATIENT REHABILITATION CENTER [facility]   [] [place] [address]    [] [facility]   [] [facility]    Date: 2022  Patient Name:  Sorin Pena  : 1965  MRN: [id number]  CSN: [id number]    Referring Practitioner REJI Bentley*   Diagnosis Myofascial pain [M79.18]  Cervical spondylosis [M47.812]  Facet hypertrophy of cervical region [M47.812]  Cervical spinal stenosis [M48.02]  Lumbar radiculopathy [M54.16]  Spinal stenosis of lumbar region without neurogenic claudication [M48.061]  Facet hypertrophy of lumbar region [M47.816]  Lumbar spondylosis [M47.816]  Chronic pain syndrome [G89.4]    Treatment Diagnosis Neck pain, ROM deficits, radiculopathy   Date of Evaluation 22    Additional Pertinent History MI, HTN, irregular heartbeat, vertigo, COPD, Anxiety disorder, stroke, cervical fusion      Functional Outcome Measure Used NDI   Functional Outcome Score 17/45 (Does not drive) ()       Insurance: Primary: Payor: [employer] [place] MEDICAID /  /  / ,   Secondary:    Authorization Information: OUTPATIENT BENEFITS:              DEDUCTIBLE:  n/a              OUT OF POCKET:  n/a               INSURANCE PAYS AT: 100%               PATIENT RESPONSIBILITY AND/OR CO-PAY: n/a  SECONDARY INSURANCE COMPANY: NA.      INSURANCE THERAPY BENEFIT: No precert until after 91TE visit. Visit Limit Based on Precert  AQUATIC THERAPY COVERED:   Yes  MODALITIES COVERED:  Yes except for Iontophoresis and Hot/Cold Packs. TELEHEALTH COVERED: Yes   Visit # 2, 2/10 for progress note   Visits Allowed: 30 visits before precert required   Recertification Date:    Physician Follow-Up: Pending progress   Physician Orders: Dry needling   History of Present Illness: Chapito Robison is a 64year old female with chief complaint of neck pain.  Patient reports problem began in early 2000s after a fracture of her upper cervical spine. She reports consistent low level pain until she suffered a stroke in July of 2022. She has seen by pain management and neurosurgeon in regard to her neck. Patient has had imaging and a consult for surgery. Additional history involving this problem includes long standing fusion that has allegedly impacted her blood flow to the brain. Patient feels relief with laying down and use of medication (muscle relaxer). She notes worse pain with flexion. They rate their pain generally as 5/10 but 10/10 at worst. Patient has been limited with her day to day functioning, including care of her grandchild due to their issue. They are seeking therapy services for pain relief and further management as needed. SUBJECTIVE: Patient reports no signficant difference in her neck pain since evaluation. She does express some radicular symptoms in Left UE but nothing consistent.     OBJECTIVE:  TREATMENT   Precautions: C5-C6 anterior fusion   Pain:     X in shaded column indicates activity completed today   Modalities Parameters/  Location  Notes                     Manual Therapy Time/Technique  Notes   Subocciptal release 8 minutes X Educated patient on positioning and possible trial at home               Exercise/Intervention   Notes   Seated       Cervical AROM Flexion/Extension/Lateral Flexion/Rotation 5x  X    Scapular retraction 10x Orange X    Horizontal pull aparts 10x Orange X           Supine       Chin tucks 10x  X                                                Specific Interventions Next Treatment: Dry needling per physician order, neck ROM, soft tissue mobilization, general postural education/mechanics    Activity/Treatment Tolerance:  [x]  Patient tolerated treatment well  []  Patient limited by fatigue  []  Patient limited by pain   []  Patient limited by medical complications  []  Other:     Assessment: Patient tolerating full supine position with chin tuck exercises with fair to good technique using minimal tactile cues for guidance. Patient also reporting some tenderness to Left suboccipital region during release which was able to be adjusted with hand placement. She reported significant reduction in neck pain while completing seated exercises including scapular retraction and horizontal pull aparts. GOALS:  Patient Goal: To have less pain    Short Term Goals:   Time Frame: 4 weeks     -Patient will report decreased neck pain to less than or equal to 3/10 during therapy exercises in order to reduce use of modalities or pain medications.    -Patient will improve AROM of bilateral cervical rotation to at least 45 degrees in order to complete functional movements with less difficulty or hesitation.   -Patient will improve AROM of bilateral cervical lateral flexion to at least 30 degrees in order to complete functional movements with less difficulty or hesitation.   -Patient will MMT greater than or equal to  4+/5 in all cervical motions in order to improve mobility of neck. -Patient will demonstrate improved postural strength/awareness as evidenced by scapular retraction while sitting without cues. Long Term Goals:   Time Frame: 8 weeks    -Patient will report less than or equal to 1/10 pain in neck in order to improve quality of life.   -Patient will report improved quality of life as evidenced by NDI score less than or equal to 10/45.   -Patient will have no increased pain with pushing, pulling, and lifting activities in order to improve functional movement patterns.   -Patient will demonstrate understanding of HEP upon discharge in order to maintain good mobility upon discharge.          Patient Education:   [x]  HEP/Education Completed: Plan of Care, Goals, Anatomy of cervical spine and effect from fusion  [address] Access Code:  []  No new Education completed  []  Reviewed Prior HEP      [x]  Patient verbalized and/or demonstrated

## 2022-09-30 ENCOUNTER — HOSPITAL ENCOUNTER (OUTPATIENT)
Dept: PHYSICAL THERAPY | Age: 57
Setting detail: THERAPIES SERIES
Discharge: HOME | End: 2022-09-30
Payer: MEDICAID

## 2022-09-30 PROCEDURE — 97140 MANUAL THERAPY 1/> REGIONS: CPT

## 2022-09-30 PROCEDURE — 97110 THERAPEUTIC EXERCISES: CPT

## 2022-09-30 NOTE — PROGRESS NOTES
[address] [facility]  PHYSICAL THERAPY  [x] DAILY NOTE (LAND) [] DAILY NOTE (AQUATIC ) [] PROGRESS NOTE [] DISCHARGE NOTE    [x] OUTPATIENT REHABILITATION CENTER [facility]   [] [place] [address]    [] [facility]   [] [facility]    Date: 2022  Patient Name:  Jesus Mcgregor  : 1965  MRN: [id number]  CSN: [id number]    Referring Practitioner REJI Stallworth*   Diagnosis Myofascial pain [M79.18]  Cervical spondylosis [M47.812]  Facet hypertrophy of cervical region [M47.812]  Cervical spinal stenosis [M48.02]  Lumbar radiculopathy [M54.16]  Spinal stenosis of lumbar region without neurogenic claudication [M48.061]  Facet hypertrophy of lumbar region [M47.816]  Lumbar spondylosis [M47.816]  Chronic pain syndrome [G89.4]    Treatment Diagnosis Neck pain, ROM deficits, radiculopathy   Date of Evaluation 22    Additional Pertinent History MI, HTN, irregular heartbeat, vertigo, COPD, Anxiety disorder, stroke, cervical fusion      Functional Outcome Measure Used NDI   Functional Outcome Score 17/45 (Does not drive) ()       Insurance: Primary: Payor: [employer] [place] MEDICAID /  /  / ,   Secondary:    Authorization Information: OUTPATIENT BENEFITS:              DEDUCTIBLE:  n/a              OUT OF POCKET:  n/a               INSURANCE PAYS AT: 100%               PATIENT RESPONSIBILITY AND/OR CO-PAY: n/a  SECONDARY INSURANCE COMPANY: NA.      INSURANCE THERAPY BENEFIT: No precert until after 01UL visit. Visit Limit Based on Precert  AQUATIC THERAPY COVERED:   Yes  MODALITIES COVERED:  Yes except for Iontophoresis and Hot/Cold Packs. TELEHEALTH COVERED: Yes   Visit # 3, 3/10 for progress note   Visits Allowed: 30 visits before precert required   Recertification Date:    Physician Follow-Up: Pending progress   Physician Orders: Dry needling   History of Present Illness: Kimberlyn Talley is a 64year old female with chief complaint of neck pain.  Patient reports problem began in early 2000s after a fracture of her upper cervical spine. She reports consistent low level pain until she suffered a stroke in July of 2022. She has seen by pain management and neurosurgeon in regard to her neck. Patient has had imaging and a consult for surgery. Additional history involving this problem includes long standing fusion that has allegedly impacted her blood flow to the brain. Patient feels relief with laying down and use of medication (muscle relaxer). She notes worse pain with flexion. They rate their pain generally as 5/10 but 10/10 at worst. Patient has been limited with her day to day functioning, including care of her grandchild due to their issue. They are seeking therapy services for pain relief and further management as needed. SUBJECTIVE: Patient reports of 5/10 pain. Notes pain mainly at left upper shoulder pointing to left upper trap. She also has headaches pointing to frontal region of head. She states that the left upper shoulder swells up a lot also. Prednisone medication helped in which she took it one month ago. Patient does take Baclofen muscle relaxant in which she takes at night time due to significant drowsiness. Ibuprofen 12 tablets per day. She does report of a stroke in July and had EEG yesterday. She is being worked up for reasons for her stroke at this time. She reports of dizziness at times since her stroke that can come on and off. Sometimes with getting up from supine to sitting. Patient is on long term blood thinner, Plavix and Dry needling is not recommended when patient is on a blood thinner, She is also has confusion at times.      OBJECTIVE:  TREATMENT   Precautions: C5-C6 anterior fusion   Pain:     X in shaded column indicates activity completed today   Modalities Parameters/  Location  Notes                     Manual Therapy Time/Technique  Notes   Subocciptal release 8 minutes  Educated patient on positioning and possible trial at home Soft tissue mobilization: upper traps left >right, levator scapula muscles right> left, bilateral cervical and suboccipital region 12 minutes x Noted trigger point at left upper trap but able to reduce this trigger point with soft tissue mobilizations and myofascial release. Mild tenderness yet at left suboccipital region. Exercise/Intervention   Notes   Seated       Cervical rotation, head nod to partial neck flexion,  through available range only due to cervical fusion. 5x  X Precautions Patient has cervical fusion lower cervical region. Scapular retraction, posterior shoulder rolls. 10x  X    Horizontal pull aparts 10x Orange     Horizontal abduction/adduction  10x  x    Supine       Chin tucks 10x                    Posture awareness: correction  Sitting position  Lumbar roll with modified towel roll x                           Specific Interventions Next Treatment: Dry needling per physician order, neck ROM, soft tissue mobilization, general postural education/mechanics  Dry Needling Safety Questions Response   Are you currently receiving any form of cancer treatment? No   Do you have any form of a bleeding disorder? No   Have you ever fainted or experienced a seizure? Yes: Comment:     Do you have a pacemaker or any other electrical implant? No   Are you currently taking any anticoagulants? Yes: Comment: Plavix    Are you currently taking antibiotics for an infection? No   Do you have a damaged heart valve, metal prosthesis, or other risk of infection? No   Are you pregnant or actively trying for a pregnancy? No   Do you have breast implants? No   Do you suffer from metal allergies? No   Are you diabetic or do you suffer from impaired wound healing? No   Do you have hepatitis B, hepatitis C, HIV, or any other infectious disease? No   Have you eaten in the last two hours? Yes: Comment:      Dry needling not to be completed due to on blood thinner medication, Plavix.  Impaired wound healing, also  Activity/Treatment Tolerance:  [x]  Patient tolerated treatment well  []  Patient limited by fatigue  []  Patient limited by pain   []  Patient limited by medical complications  []  Other:     Assessment: Patient consulted for dry needling. Patient is on blood thinner medication and unable to dry needle due to being on long term blood thinner, Plavix. She also reports of impaired wound healing. Session consisted of gentle neck rotation and flexion through available painfree ranges only. Scapular exercises and addition of horizontal abduction for posterior shoulder girdle strengthening. Posture awareness with sitting with lumbar roll. Patient reports of diminished pain to 1/10 at left upper trap and neck region. GOALS:  Patient Goal: To have less pain    Short Term Goals:   Time Frame: 4 weeks     -Patient will report decreased neck pain to less than or equal to 3/10 during therapy exercises in order to reduce use of modalities or pain medications.    -Patient will improve AROM of bilateral cervical rotation to at least 45 degrees in order to complete functional movements with less difficulty or hesitation.   -Patient will improve AROM of bilateral cervical lateral flexion to at least 30 degrees in order to complete functional movements with less difficulty or hesitation.   -Patient will MMT greater than or equal to  4+/5 in all cervical motions in order to improve mobility of neck. -Patient will demonstrate improved postural strength/awareness as evidenced by scapular retraction while sitting without cues.           Long Term Goals:   Time Frame: 8 weeks    -Patient will report less than or equal to 1/10 pain in neck in order to improve quality of life.   -Patient will report improved quality of life as evidenced by NDI score less than or equal to 10/45.   -Patient will have no increased pain with pushing, pulling, and lifting activities in order to improve functional movement patterns.   -Patient will demonstrate understanding of HEP upon discharge in order to maintain good mobility upon discharge. Patient Education:   [x]  HEP/Education Completed: Plan of Care, Goals,use of towel roll (lumbar roll) when sitting to correct sitting posture and decrease neck strain, Application of contrast heat 15min and cold for 10 minutes, only painfree available ROM in rotation and flexion of the neck ONLY. Posture exercises. [employer] Access Code:  []  No new Education completed  []  Reviewed Prior HEP      [x]  Patient verbalized and/or demonstrated understanding of education provided. []  Patient unable to verbalize and/or demonstrate understanding of education provided. Will continue education. []  Barriers to learning:     PLAN:  Treatment Recommendations: Strengthening, Range of Motion, Neuromuscular Re-education, Manual Therapy - Soft Tissue Mobilization, Pain Management, Home Exercise Program, Patient Education, Integrative Dry Needling, and Modalities    []  Plan of care initiated. Plan to see patient 2 times per week for 8 weeks to address the treatment planned outlined above.   [x]  Continue with current plan of care  []  Modify plan of care as follows:    []  Hold pending physician visit  []  Discharge    Time In 1515   Time Out 1555   Timed Code Minutes: 38   Total Treatment Time: 40       Electronically Signed by: Javan Chawla, PT

## 2022-10-03 ENCOUNTER — HOSPITAL ENCOUNTER (OUTPATIENT)
Dept: PHYSICAL THERAPY | Age: 57
Setting detail: THERAPIES SERIES
Discharge: HOME | End: 2022-10-03
Payer: MEDICAID

## 2022-10-03 PROCEDURE — 97140 MANUAL THERAPY 1/> REGIONS: CPT

## 2022-10-03 NOTE — PROGRESS NOTES
[address] [facility]  PHYSICAL THERAPY  [x] DAILY NOTE (LAND) [] DAILY NOTE (AQUATIC ) [] PROGRESS NOTE [] DISCHARGE NOTE    [x] OUTPATIENT REHABILITATION [facility]   [] [place] [address]    [] [facility]   [] Ty Conn    Date: 10/3/2022  Patient Name:  Tam Cai  : 1965  MRN: [id number]  CSN: [id number]    Referring Practitioner SEAMUS Rodas   Diagnosis Myofascial pain [M79.18]  Cervical spondylosis [M47.812]  Facet hypertrophy of cervical region [M47.812]  Cervical spinal stenosis [M48.02]  Lumbar radiculopathy [M54.16]  Spinal stenosis of lumbar region without neurogenic claudication [M48.061]  Facet hypertrophy of lumbar region [M47.816]  Lumbar spondylosis [M47.816]  Chronic pain syndrome [G89.4]    Treatment Diagnosis Neck pain, ROM deficits, radiculopathy   Date of Evaluation 22    Additional Pertinent History MI, HTN, irregular heartbeat, vertigo, COPD, Anxiety disorder, stroke, cervical fusion      Functional Outcome Measure Used NDI   Functional Outcome Score 17/45 (Does not drive) (72)       Insurance: Primary: Payor: "[employer] [place] MEDICAID /  /  / ,   Secondary:    Authorization Information: OUTPATIENT BENEFITS:              DEDUCTIBLE:  n/a              OUT OF POCKET:  n/a               INSURANCE PAYS AT: 100%               PATIENT RESPONSIBILITY AND/OR CO-PAY: n/a  SECONDARY INSURANCE COMPANY: NA.      INSURANCE THERAPY BENEFIT: No precert until after 15IA visit. Visit Limit Based on Precert  AQUATIC THERAPY COVERED:   Yes  MODALITIES COVERED:  Yes except for Iontophoresis and Hot/Cold Packs. TELEHEALTH COVERED: Yes   Visit # 4, 4/10 for progress note   Visits Allowed: 30 visits before precert required   Recertification Date:    Physician Follow-Up: Pending progress   Physician Orders: Dry needling   History of Present Illness: Samia Scott is a 64year old female with chief complaint of neck pain.  Patient reports problem began in early 2000s after a fracture of her upper cervical spine. She reports consistent low level pain until she suffered a stroke in July of 2022. She has seen by pain management and neurosurgeon in regard to her neck. Patient has had imaging and a consult for surgery. Additional history involving this problem includes long standing fusion that has allegedly impacted her blood flow to the brain. Patient feels relief with laying down and use of medication (muscle relaxer). She notes worse pain with flexion. They rate their pain generally as 5/10 but 10/10 at worst. Patient has been limited with her day to day functioning, including care of her grandchild due to their issue. They are seeking therapy services for pain relief and further management as needed. SUBJECTIVE: Patient reports of 1/10 pain. Notes pain mainly at left upper shoulder pointing to left upper trap and at her left side of her neck. Patient denies of headache right now. But in morning had a bad headache so took Ibuprofen and this helped. Patient is on long term blood thinner, Plavix and Dry needling is not recommended when patient is on a blood thinner,     OBJECTIVE:  TREATMENT   Precautions: C5-C6 anterior fusion   Pain:     X in shaded column indicates activity completed today   Modalities Parameters/  Location  Notes                     Manual Therapy Time/Technique  Notes   Subocciptal release 3 minutes x    Soft tissue mobilization: upper traps left >right, levator scapula muscles right> left, bilateral cervical and suboccipital region 15 minutes x Noted trigger point at left upper trap but able to reduce this trigger point with soft tissue mobilizations and myofascial release. Mild tenderness yet at left suboccipital region. Exercise/Intervention   Notes   Seated       Cervical rotation, head nod to partial neck flexion,  through available range only due to cervical fusion.   5x  X Precautions Patient has cervical fusion lower cervical region. Dizziness reported with head nod with slight forward flexion so held on. Scapular retraction, posterior shoulder rolls. 10x, 5x  X    Horizontal pull aparts 10x Orange     Horizontal abduction/adduction  10x      Supine       Chin tucks 10x                    Posture awareness: correction  Sitting position  Lumbar roll with modified towel roll x                           Specific Interventions Next Treatment: Dry needling per physician order, neck ROM, soft tissue mobilization, general postural education/mechanics   Dry needling not to be completed due to on blood thinner medication, Plavix. Impaired wound healing, also  Activity/Treatment Tolerance:  [x]  Patient tolerated treatment well  []  Patient limited by fatigue  []  Patient limited by pain   []  Patient limited by medical complications  []  Other:     Assessment:Patient instructed to turn torsos when scanning right and left to decrease stress on cervical spine. Only rotate through available painfree range of motion. Ex limited with patient reporting of dizziness with forward flexion today. Continued with soft tissue mobiliation/manual soft tissue work with minimal to no trigger point noted today at left upper trap. Pain level at end of session 0/10.     GOALS:  Patient Goal: To have less pain    Short Term Goals:   Time Frame: 4 weeks     -Patient will report decreased neck pain to less than or equal to 3/10 during therapy exercises in order to reduce use of modalities or pain medications.    -Patient will improve AROM of bilateral cervical rotation to at least 45 degrees in order to complete functional movements with less difficulty or hesitation.   -Patient will improve AROM of bilateral cervical lateral flexion to at least 30 degrees in order to complete functional movements with less difficulty or hesitation.   -Patient will MMT greater than or equal to  4+/5 in all cervical motions in order to improve mobility of neck. -Patient will demonstrate improved postural strength/awareness as evidenced by scapular retraction while sitting without cues. Long Term Goals:   Time Frame: 8 weeks    -Patient will report less than or equal to 1/10 pain in neck in order to improve quality of life.   -Patient will report improved quality of life as evidenced by NDI score less than or equal to 10/45.   -Patient will have no increased pain with pushing, pulling, and lifting activities in order to improve functional movement patterns.   -Patient will demonstrate understanding of HEP upon discharge in order to maintain good mobility upon discharge. Patient Education:   [x]  HEP/Education Completed: Plan of Care, Goals,use of towel roll (lumbar roll) when sitting to correct sitting posture and decrease neck strain, Application of contrast heat 15min and cold for 10 minutes, only painfree available ROM in rotation and flexion of the neck ONLY. Posture exercises. [employer] Access Code:  []  No new Education completed  []  Reviewed Prior HEP      [x]  Patient verbalized and/or demonstrated understanding of education provided. []  Patient unable to verbalize and/or demonstrate understanding of education provided. Will continue education. []  Barriers to learning:     PLAN:  Treatment Recommendations: Strengthening, Range of Motion, Neuromuscular Re-education, Manual Therapy - Soft Tissue Mobilization, Pain Management, Home Exercise Program, Patient Education, Integrative Dry Needling, and Modalities    []  Plan of care initiated. Plan to see patient 2 times per week for 8 weeks to address the treatment planned outlined above.   [x]  Continue with current plan of care  []  Modify plan of care as follows:    []  Hold pending physician visit  []  Discharge    Time In 1530   Time Out 1555   Timed Code Minutes: 22   Total Treatment Time: 25       Electronically Signed by: Zaire Saunders, PT

## 2022-10-11 ENCOUNTER — APPOINTMENT (OUTPATIENT)
Dept: PHYSICAL THERAPY | Age: 57
End: 2022-10-11
Payer: MEDICAID

## 2022-10-11 ENCOUNTER — HOSPITAL ENCOUNTER (OUTPATIENT)
Dept: PHYSICAL THERAPY | Age: 57
Setting detail: THERAPIES SERIES
Discharge: HOME | End: 2022-10-11
Payer: MEDICAID

## 2022-10-11 PROCEDURE — 97110 THERAPEUTIC EXERCISES: CPT

## 2022-10-11 PROCEDURE — 97530 THERAPEUTIC ACTIVITIES: CPT

## 2022-10-11 NOTE — PROGRESS NOTES
[address] [facility]  PHYSICAL THERAPY  [x] DAILY NOTE (LAND) [] DAILY NOTE (AQUATIC ) [] PROGRESS NOTE [] DISCHARGE NOTE    [x] OUTPATIENT REHABILITATION [facility]   [] [place] [address]    [] [facility]   [] Natalie Yesenia    Date: 10/11/2022  Patient Name:  Ralf Sanders  : 1965  MRN: [id number]  CSN: [id number]    Referring Practitioner SEAMUS Bettencourt   Diagnosis Myofascial pain [M79.18]  Cervical spondylosis [M47.812]  Facet hypertrophy of cervical region [M47.812]  Cervical spinal stenosis [M48.02]  Lumbar radiculopathy [M54.16]  Spinal stenosis of lumbar region without neurogenic claudication [M48.061]  Facet hypertrophy of lumbar region [M47.816]  Lumbar spondylosis [M47.816]  Chronic pain syndrome [G89.4]    Treatment Diagnosis Neck pain, ROM deficits, radiculopathy   Date of Evaluation 22    Additional Pertinent History MI, HTN, irregular heartbeat, vertigo, COPD, Anxiety disorder, stroke, cervical fusion      Functional Outcome Measure Used NDI   Functional Outcome Score 17/45 (Does not drive) (68)       Insurance: Primary: Payor: [employer] [place] MEDICAID /  /  / ,   Secondary:    Authorization Information: OUTPATIENT BENEFITS:              DEDUCTIBLE:  n/a              OUT OF POCKET:  n/a               INSURANCE PAYS AT: 100%               PATIENT RESPONSIBILITY AND/OR CO-PAY: n/a  SECONDARY INSURANCE COMPANY: NA.      INSURANCE THERAPY BENEFIT: No precert until after 15ZT visit. Visit Limit Based on Precert  AQUATIC THERAPY COVERED:   Yes  MODALITIES COVERED:  Yes except for Iontophoresis and Hot/Cold Packs. TELEHEALTH COVERED: Yes   Visit # 5, 5/10 for progress note   Visits Allowed: 30 visits before precert required   Recertification Date:    Physician Follow-Up: Pending progress   Physician Orders: Dry needling   History of Present Illness: Haylie Camilo is a 64year old female with chief complaint of neck pain.  Patient reports problem began in early 2000s after a fracture of her upper cervical spine. She reports consistent low level pain until she suffered a stroke in July of 2022. She has seen by pain management and neurosurgeon in regard to her neck. Patient has had imaging and a consult for surgery. Additional history involving this problem includes long standing fusion that has allegedly impacted her blood flow to the brain. Patient feels relief with laying down and use of medication (muscle relaxer). She notes worse pain with flexion. They rate their pain generally as 5/10 but 10/10 at worst. Patient has been limited with her day to day functioning, including care of her grandchild due to their issue. They are seeking therapy services for pain relief and further management as needed. SUBJECTIVE: Patient reports no pain while sitting still but 3/10 in upper thoracic/lower cervical spine. She states that she may be sleeping wrong as a factor. OBJECTIVE:  TREATMENT   Precautions: C5-C6 anterior fusion   Pain:     X in shaded column indicates activity completed today   Modalities Parameters/  Location  Notes                     Manual Therapy Time/Technique  Notes   Subocciptal release 3 minutes     Soft tissue mobilization: upper traps left >right, levator scapula muscles right> left, bilateral cervical and suboccipital region 15 minutes  Noted trigger point at left upper trap but able to reduce this trigger point with soft tissue mobilizations and myofascial release. Mild tenderness yet at left suboccipital region. Exercise/Intervention   Notes   Seated       Cervical rotation, head nod to partial neck flexion,  through available range only due to cervical fusion.   10x  X Cued to stop at pain limiting range    Scapular retraction 10x Orange X    Posterior shoulder rolls 5x      Horizontal pull aparts 10x Orange X           Supine       Chin tucks 10x      Sidelying upper trunk rotation* 3x 20s  X Used wedge for support          Posture awareness: correction  Sitting position  Lumbar roll with modified towel roll     Standing       Arm extension* 10x Orange X    Tricep extension* 10x Orange X      Specific Interventions Next Treatment: Dry needling per physician order, neck ROM, soft tissue mobilization, general postural education/mechanics   Dry needling not to be completed due to on blood thinner medication, Plavix. Impaired wound healing, also  Activity/Treatment Tolerance:  [x]  Patient tolerated treatment well  []  Patient limited by fatigue  []  Patient limited by pain   []  Patient limited by medical complications  []  Other:     Assessment: Patient reporing much improved pain by end of treatment with intentional focus on postural stabilization and awareness. She demonstrates good ability to complete with simple cueing at this time. Patient expressing concerns about scheduling as the weather getting back could impact her transportation. She was agreeable to call for cancellation, if needed, and otherwise continue as scheduled. She reports on mild Left neck pain by end of treatment with movements. GOALS:  Patient Goal: To have less pain    Short Term Goals:   Time Frame: 4 weeks     -Patient will report decreased neck pain to less than or equal to 3/10 during therapy exercises in order to reduce use of modalities or pain medications.    -Patient will improve AROM of bilateral cervical rotation to at least 45 degrees in order to complete functional movements with less difficulty or hesitation.   -Patient will improve AROM of bilateral cervical lateral flexion to at least 30 degrees in order to complete functional movements with less difficulty or hesitation.   -Patient will MMT greater than or equal to  4+/5 in all cervical motions in order to improve mobility of neck. -Patient will demonstrate improved postural strength/awareness as evidenced by scapular retraction while sitting without cues.           Long Term Goals: Time Frame: 8 weeks    -Patient will report less than or equal to 1/10 pain in neck in order to improve quality of life.   -Patient will report improved quality of life as evidenced by NDI score less than or equal to 10/45.   -Patient will have no increased pain with pushing, pulling, and lifting activities in order to improve functional movement patterns.   -Patient will demonstrate understanding of HEP upon discharge in order to maintain good mobility upon discharge. Patient Education:   [x]  HEP/Education Completed: Posture exercises. [employer] Access Code:  []  No new Education completed  [x]  Reviewed Prior HEP      [x]  Patient verbalized and/or demonstrated understanding of education provided. []  Patient unable to verbalize and/or demonstrate understanding of education provided. Will continue education. []  Barriers to learning:     PLAN:  Treatment Recommendations: Strengthening, Range of Motion, Neuromuscular Re-education, Manual Therapy - Soft Tissue Mobilization, Pain Management, Home Exercise Program, Patient Education, Integrative Dry Needling, and Modalities    []  Plan of care initiated. Plan to see patient 2 times per week for 8 weeks to address the treatment planned outlined above.   [x]  Continue with current plan of care  []  Modify plan of care as follows:    []  Hold pending physician visit  []  Discharge    Time In 1405   Time Out 1359   Timed Code Minutes: 54 min   Total Treatment Time: 54 min       Electronically Signed by: Zaina Reynolds PT

## 2022-10-14 ENCOUNTER — HOSPITAL ENCOUNTER (OUTPATIENT)
Dept: PHYSICAL THERAPY | Age: 57
Setting detail: THERAPIES SERIES
Discharge: HOME | End: 2022-10-14
Payer: MEDICAID

## 2022-10-14 PROCEDURE — 97140 MANUAL THERAPY 1/> REGIONS: CPT

## 2022-10-14 PROCEDURE — 97110 THERAPEUTIC EXERCISES: CPT

## 2022-10-14 NOTE — PROGRESS NOTES
[address] [facility]  PHYSICAL THERAPY  [x] DAILY NOTE (LAND) [] DAILY NOTE (AQUATIC ) [] PROGRESS NOTE [] DISCHARGE NOTE    [x] OUTPATIENT REHABILITATION CENTER [facility]   [] [place] [address]    [] [facility]   [] [facility]    Date: 10/14/2022  Patient Name:  Ambrosio Bay  : 1965  MRN: [id number]  CSN: [id number]    Referring Practitioner REJI Yusuf*   Diagnosis Myofascial pain [M79.18]  Cervical spondylosis [M47.812]  Facet hypertrophy of cervical region [M47.812]  Cervical spinal stenosis [M48.02]  Lumbar radiculopathy [M54.16]  Spinal stenosis of lumbar region without neurogenic claudication [M48.061]  Facet hypertrophy of lumbar region [M47.816]  Lumbar spondylosis [M47.816]  Chronic pain syndrome [G89.4]    Treatment Diagnosis Neck pain, ROM deficits, radiculopathy   Date of Evaluation 22    Additional Pertinent History MI, HTN, irregular heartbeat, vertigo, COPD, Anxiety disorder, stroke, cervical fusion      Functional Outcome Measure Used NDI   Functional Outcome Score 17/45 (Does not drive) (3/96/95)       Insurance: Primary: Payor: [employer] [place] MEDICAID /  /  / ,   Secondary:    Authorization Information: OUTPATIENT BENEFITS:              DEDUCTIBLE:  n/a              OUT OF POCKET:  n/a               INSURANCE PAYS AT: 100%               PATIENT RESPONSIBILITY AND/OR CO-PAY: n/a  SECONDARY INSURANCE COMPANY: NA.      INSURANCE THERAPY BENEFIT: No precert until after 13PV visit. Visit Limit Based on Precert  AQUATIC THERAPY COVERED:   Yes  MODALITIES COVERED:  Yes except for Iontophoresis and Hot/Cold Packs. TELEHEALTH COVERED: Yes   Visit # 6, /10 for progress note   Visits Allowed: 30 visits before precert required   Recertification Date:    Physician Follow-Up: Pending progress   Physician Orders: Dry needling   History of Present Illness: Shaun Turcios is a 64year old female with chief complaint of neck pain.  Patient reports problem began in early 2000s after a fracture of her upper cervical spine. She reports consistent low level pain until she suffered a stroke in July of 2022. She has seen by pain management and neurosurgeon in regard to her neck. Patient has had imaging and a consult for surgery. Additional history involving this problem includes long standing fusion that has allegedly impacted her blood flow to the brain. Patient feels relief with laying down and use of medication (muscle relaxer). She notes worse pain with flexion. They rate their pain generally as 5/10 but 10/10 at worst. Patient has been limited with her day to day functioning, including care of her grandchild due to their issue. They are seeking therapy services for pain relief and further management as needed. SUBJECTIVE: Subjective reports of mild cervical and upper trap pain upon arrival, primarily on Left side, rates 3/10. Patient reports that she has been utilizing ice and heat on a regular basis to alleviate pain as needed. OBJECTIVE:  TREATMENT   Precautions: C5-C6 anterior fusion   Pain:     X in shaded column indicates activity completed today   Modalities Parameters/  Location  Notes                     Manual Therapy Time/Technique  Notes   Subocciptal release 3 minutes x    Soft tissue mobilization: upper traps left >right, levator scapula muscles right> left, bilateral cervical and suboccipital region 15 minutes x Noted trigger point at left upper trap but able to reduce this trigger point with soft tissue mobilizations and myofascial release. Mild tenderness yet at left suboccipital region. Exercise/Intervention   Notes   Seated       Cervical rotation, head nod to partial neck flexion,  through available range only due to cervical fusion.   10x  x Cued to stop at pain limiting range    Scapular retraction 10x Orange     Posterior shoulder rolls 5x      Horizontal pull aparts 10x Orange     Levator stretch * [id number]  x Upper trap stretch * [id number]  x           Supine       Chin tucks 10x      Sidelying upper trunk rotation 3x 20s  X Used wedge for support          Posture awareness: correction  Sitting position  Lumbar roll with modified towel roll     Standing       Arm extension 10x Orange x    Tricep extension 10x Orange x      Specific Interventions Next Treatment: Dry needling per physician order, neck ROM, soft tissue mobilization, general postural education/mechanics   Dry needling not to be completed due to on blood thinner medication, Plavix. Impaired wound healing, also  Activity/Treatment Tolerance:  [x]  Patient tolerated treatment well  []  Patient limited by fatigue  []  Patient limited by pain   []  Patient limited by medical complications  []  Other:     Assessment: Treatment interventions completed as recorded above. Additional interventions marked by * in chart. Patient reported significant relief from neck pain and tightness at termination of treatment session. Patient tolerated treatment well. GOALS:  Patient Goal: To have less pain    Short Term Goals:   Time Frame: 4 weeks     -Patient will report decreased neck pain to less than or equal to 3/10 during therapy exercises in order to reduce use of modalities or pain medications.    -Patient will improve AROM of bilateral cervical rotation to at least 45 degrees in order to complete functional movements with less difficulty or hesitation.   -Patient will improve AROM of bilateral cervical lateral flexion to at least 30 degrees in order to complete functional movements with less difficulty or hesitation.   -Patient will MMT greater than or equal to  4+/5 in all cervical motions in order to improve mobility of neck. -Patient will demonstrate improved postural strength/awareness as evidenced by scapular retraction while sitting without cues.           Long Term Goals:   Time Frame: 8 weeks    -Patient will report less than or equal to 1/10 pain in neck in order to improve quality of life.   -Patient will report improved quality of life as evidenced by NDI score less than or equal to 10/45.   -Patient will have no increased pain with pushing, pulling, and lifting activities in order to improve functional movement patterns.   -Patient will demonstrate understanding of HEP upon discharge in order to maintain good mobility upon discharge. Patient Education:     HEP/Education Completed: Posture exercises. [employer] Access Code:  [x]  No new Education completed  []  Reviewed Prior HEP      []  Patient verbalized and/or demonstrated understanding of education provided. []  Patient unable to verbalize and/or demonstrate understanding of education prov[x]ided. Will continue education. []  Barriers to learning:     PLAN:  Treatment Recommendations: Strengthening, Range of Motion, Neuromuscular Re-education, Manual Therapy - Soft Tissue Mobilization, Pain Management, Home Exercise Program, Patient Education, Integrative Dry Needling, and Modalities    []  Plan of care initiated. Plan to see patient 2 times per week for 8 weeks to address the treatment planned outlined above.   [x]  Continue with current plan of care  []  Modify plan of care as follows:    []  Hold pending physician visit  []  Discharge    Time In 1615   Time Out 1701   Timed Code Minutes: 46 min   Total Treatment Time: 46 min       Electronically Signed by: Ethan Estrella PTA

## 2022-10-17 ENCOUNTER — HOSPITAL ENCOUNTER (OUTPATIENT)
Dept: PHYSICAL THERAPY | Age: 57
Setting detail: THERAPIES SERIES
Discharge: HOME | End: 2022-10-17
Payer: MEDICAID

## 2022-10-17 PROCEDURE — 97140 MANUAL THERAPY 1/> REGIONS: CPT

## 2022-10-17 PROCEDURE — 97110 THERAPEUTIC EXERCISES: CPT

## 2022-10-17 NOTE — PROGRESS NOTES
[address] [facility]  PHYSICAL THERAPY  [x] DAILY NOTE (LAND) [] DAILY NOTE (AQUATIC ) [] PROGRESS NOTE [] DISCHARGE NOTE    [x] OUTPATIENT REHABILITATION CENTER [facility]   [] [place] [address]    [] [facility]   [] Julia Trinh    Date: 10/17/2022  Patient Name:  Germán Gresham  : 1965  MRN: [id number]  CSN: [id number]    Referring Practitioner SEAMUS Bragg   Diagnosis Myofascial pain [M79.18]  Cervical spondylosis [M47.812]  Facet hypertrophy of cervical region [M47.812]  Cervical spinal stenosis [M48.02]  Lumbar radiculopathy [M54.16]  Spinal stenosis of lumbar region without neurogenic claudication [M48.061]  Facet hypertrophy of lumbar region [M47.816]  Lumbar spondylosis [M47.816]  Chronic pain syndrome [G89.4]    Treatment Diagnosis Neck pain, ROM deficits, radiculopathy   Date of Evaluation 22    Additional Pertinent History MI, HTN, irregular heartbeat, vertigo, COPD, Anxiety disorder, stroke, cervical fusion      Functional Outcome Measure Used NDI   Functional Outcome Score 17/45 (Does not drive) ()       Insurance: Primary: Payor: [employer] [place] MEDICAID /  /  / ,   Secondary:    Authorization Information: OUTPATIENT BENEFITS:              DEDUCTIBLE:  n/a              OUT OF POCKET:  n/a               INSURANCE PAYS AT: 100%               PATIENT RESPONSIBILITY AND/OR CO-PAY: n/a  SECONDARY INSURANCE COMPANY: NA.      INSURANCE THERAPY BENEFIT: No precert until after 59GX visit. Visit Limit Based on Precert  AQUATIC THERAPY COVERED:   Yes  MODALITIES COVERED:  Yes except for Iontophoresis and Hot/Cold Packs. TELEHEALTH COVERED: Yes   Visit # 7, 7/10 for progress note   Visits Allowed: 30 visits before precert required   Recertification Date:    Physician Follow-Up: Pending progress   Physician Orders: Dry needling   History of Present Illness: Rajni Hitchcock is a 64year old female with chief complaint of neck pain.  Patient reports problem began in early 2000s after a fracture of her upper cervical spine. She reports consistent low level pain until she suffered a stroke in July of 2022. She has seen by pain management and neurosurgeon in regard to her neck. Patient has had imaging and a consult for surgery. Additional history involving this problem includes long standing fusion that has allegedly impacted her blood flow to the brain. Patient feels relief with laying down and use of medication (muscle relaxer). She notes worse pain with flexion. They rate their pain generally as 5/10 but 10/10 at worst. Patient has been limited with her day to day functioning, including care of her grandchild due to their issue. They are seeking therapy services for pain relief and further management as needed. SUBJECTIVE: Pt reports 2/10 pain. Denies radicular symptoms. Notes feeling tender in L UT.     OBJECTIVE:  TREATMENT   Precautions: C5-C6 anterior fusion   Pain:     X in shaded column indicates activity completed today   Modalities Parameters/  Location  Notes                     Manual Therapy Time/Technique  Notes   Subocciptal release 3 minutes x    Soft tissue mobilization: upper traps left >right, levator scapula muscles right> left, bilateral cervical and suboccipital region 12 minutes x Decreased restrictions bilaterally. Moderate tenderness noted L UT> R UT          Exercise/Intervention   Notes   Seated       Cervical rotation, head nod to partial neck flexion,  through available range only due to cervical fusion.   10x   Cued to stop at pain limiting range    Scapular retraction 10x Orange x No band this date   Posterior shoulder rolls 10*x  x    Horizontal pull aparts 10x Orange     Levator stretch  [id number]  x    Upper trap stretch  [id number]  x           Supine       Chin tucks-seated* 10x  x    Sidelying upper trunk rotation 3x 20s   Used wedge for support          Posture awareness: correction  Sitting position  Lumbar roll with modified towel roll     Standing       Arm extension 10x Orange     Tricep extension 10x Orange       Specific Interventions Next Treatment: Dry needling per physician order, neck ROM, soft tissue mobilization, general postural education/mechanics   Dry needling not to be completed due to on blood thinner medication, Plavix. Impaired wound healing, also  Activity/Treatment Tolerance:  [x]  Patient tolerated treatment well  []  Patient limited by fatigue  []  Patient limited by pain   []  Patient limited by medical complications  []  Other:     Assessment: Pt scheduled for shorten treatment session. Continued therex as shown above per flow sheet. Pt presented with reduced restrictions bilaterally. Slight tenderness present. Denied radicular symptoms. Pt noted feeling good at conclusion of treatment. Will continue to progress as tolerated. GOALS:  Patient Goal: To have less pain    Short Term Goals:   Time Frame: 4 weeks     -Patient will report decreased neck pain to less than or equal to 3/10 during therapy exercises in order to reduce use of modalities or pain medications.    -Patient will improve AROM of bilateral cervical rotation to at least 45 degrees in order to complete functional movements with less difficulty or hesitation.   -Patient will improve AROM of bilateral cervical lateral flexion to at least 30 degrees in order to complete functional movements with less difficulty or hesitation.   -Patient will MMT greater than or equal to  4+/5 in all cervical motions in order to improve mobility of neck. -Patient will demonstrate improved postural strength/awareness as evidenced by scapular retraction while sitting without cues.           Long Term Goals:   Time Frame: 8 weeks    -Patient will report less than or equal to 1/10 pain in neck in order to improve quality of life.   -Patient will report improved quality of life as evidenced by NDI score less than or equal to 10/45.   -Patient will have no increased pain with pushing, pulling, and lifting activities in order to improve functional movement patterns.   -Patient will demonstrate understanding of HEP upon discharge in order to maintain good mobility upon discharge. Patient Education:     HEP/Education Completed: Posture exercises. [employer] Access Code:  [x]  No new Education completed  []  Reviewed Prior HEP      []  Patient verbalized and/or demonstrated understanding of education provided. []  Patient unable to verbalize and/or demonstrate understanding of education prov[x]ided. Will continue education. []  Barriers to learning:     PLAN:  Treatment Recommendations: Strengthening, Range of Motion, Neuromuscular Re-education, Manual Therapy - Soft Tissue Mobilization, Pain Management, Home Exercise Program, Patient Education, Integrative Dry Needling, and Modalities    []  Plan of care initiated. Plan to see patient 2 times per week for 8 weeks to address the treatment planned outlined above.   [x]  Continue with current plan of care  []  Modify plan of care as follows:    []  Hold pending physician visit  []  Discharge    Time In 1557   Time Out 1622   Timed Code Minutes: 25   Total Treatment Time: 25       Electronically Signed by: Mikala Carballo PTA

## 2022-10-20 ENCOUNTER — OFFICE VISIT (OUTPATIENT)
Dept: NEUROSURGERY | Age: 57
End: 2022-10-20
Payer: MEDICAID

## 2022-10-20 ENCOUNTER — HOSPITAL ENCOUNTER (OUTPATIENT)
Dept: PHYSICAL THERAPY | Age: 57
Setting detail: THERAPIES SERIES
Discharge: HOME | End: 2022-10-20
Payer: MEDICAID

## 2022-10-20 VITALS
WEIGHT: 148 LBS | DIASTOLIC BLOOD PRESSURE: 90 MMHG | BODY MASS INDEX: 26.22 KG/M2 | SYSTOLIC BLOOD PRESSURE: 140 MMHG | HEIGHT: 63 IN

## 2022-10-20 DIAGNOSIS — M54.12 CERVICAL RADICULOPATHY: Primary | ICD-10-CM

## 2022-10-20 PROCEDURE — 4004F PT TOBACCO SCREEN RCVD TLK: CPT | Performed by: PHYSICIAN ASSISTANT

## 2022-10-20 PROCEDURE — G8419 CALC BMI OUT NRM PARAM NOF/U: HCPCS | Performed by: PHYSICIAN ASSISTANT

## 2022-10-20 PROCEDURE — 97530 THERAPEUTIC ACTIVITIES: CPT

## 2022-10-20 PROCEDURE — G8427 DOCREV CUR MEDS BY ELIG CLIN: HCPCS | Performed by: PHYSICIAN ASSISTANT

## 2022-10-20 PROCEDURE — G8484 FLU IMMUNIZE NO ADMIN: HCPCS | Performed by: PHYSICIAN ASSISTANT

## 2022-10-20 PROCEDURE — 99213 OFFICE O/P EST LOW 20 MIN: CPT | Performed by: PHYSICIAN ASSISTANT

## 2022-10-20 PROCEDURE — 3017F COLORECTAL CA SCREEN DOC REV: CPT | Performed by: PHYSICIAN ASSISTANT

## 2022-10-20 NOTE — DISCHARGE SUMMARY
[facility]  PHYSICAL [facility]ERAPY  [] DAILY NOTE (LAND) [] DAILY NOTE (AQUATIC ) [] PROGRESS NOTE [x] DISCHARGE NOTE    [x] OUTPATIENT REHABILITATION [facility]   [] [place] [address]    [] [facility]   [] [facility]    Date: 10/20/2022  Patient Name:  Jesus Mcgregor  : 1965  MRN: [id number]  CSN: [id number]    Referring Practitioner REJI Stallworth*   Diagnosis Myofascial pain [M79.18]  Cervical spondylosis [M47.812]  Facet hypertrophy of cervical region [M47.812]  Cervical spinal stenosis [M48.02]  Lumbar radiculopathy [M54.16]  Spinal stenosis of lumbar region without neurogenic claudication [M48.061]  Facet hypertrophy of lumbar region [M47.816]  Lumbar spondylosis [M47.816]  Chronic pain syndrome [G89.4]    Treatment Diagnosis Neck pain, ROM deficits, radiculopathy   Date of Evaluation 22    Additional Pertinent History MI, HTN, irregular heartbeat, vertigo, COPD, Anxiety disorder, stroke, cervical fusion      Functional Outcome Measure Used NDI   Functional Outcome Score 17/45 (Does not drive) () ; 16/74 (10/20/22)      Insurance: Primary: Payor: [employer] [facility] /  /  / ,   Secondary:    Authorization Information: OUTPATIENT BENEFITS:              DEDUCTIBLE:  n/a              OUT OF POCKET:  n/a               INSURANCE PAYS AT: 100%               PATIENT RESPONSIBILITY AND/OR CO-PAY: n/a  SECONDARY INSURANCE COMPANY: NA.      INSURANCE THERAPY BENEFIT: No precert until after 72ZG visit. Visit Limit Based on Precert  AQUATIC THERAPY COVERED:   Yes  MODALITIES COVERED:  Yes except for Iontophoresis and Hot/Cold Packs.   TELEHEALTH COVERED: Yes   Visit # 8, 0/10 for progress note   Visits Allowed: 30 visits before precert required   Recertification Date:    Physician Follow-Up: Pending progress   Physician Orders: Dry needling   History of Present Illness: Kimberlyn Talley is a 64year old female with chief complaint of neck pain. Patient reports problem began in early 2000s after a fracture of her upper cervical spine. She reports consistent low level pain until she suffered a stroke in July of 2022. She has seen by pain management and neurosurgeon in regard to her neck. Patient has had imaging and a consult for surgery. Additional history involving this problem includes long standing fusion that has allegedly impacted her blood flow to the brain. Patient feels relief with laying down and use of medication (muscle relaxer). She notes worse pain with flexion. They rate their pain generally as 5/10 but 10/10 at worst. Patient has been limited with her day to day functioning, including care of her grandchild due to their issue. They are seeking therapy services for pain relief and further management as needed. SUBJECTIVE: Patient reports some confusion but she generally notes improved pain. She states mornings can be difficult for her as her pain is very limiting. Patient reports 1/10 pain at this moment. OBJECTIVE:  TREATMENT   Precautions: C5-C6 anterior fusion; patient on blood thinners (dry needling not recommended)   Pain:     X in shaded column indicates activity completed today   Modalities Parameters/  Location  Notes                     Manual Therapy Time/Technique  Notes   Subocciptal release 3 minutes     Soft tissue mobilization: upper traps left >right, levator scapula muscles right> left, bilateral cervical and suboccipital region 12 minutes  Decreased restrictions bilaterally. Moderate tenderness noted L UT> R UT          Exercise/Intervention   Notes   Seated       Cervical rotation, head nod to partial neck flexion,  through available range only due to cervical fusion.   10x   Cued to stop at pain limiting range    Scapular retraction 10x Orange  No band this date   Posterior shoulder rolls 10x      Horizontal pull aparts 10x Orange     Levator stretch  [id number]      Upper trap stretch  [id number] Supine       Chin tucks-seated 10x      Sidelying upper trunk rotation 3x 20s   Used wedge for support          Posture awareness: correction  Sitting position  Lumbar roll with modified towel roll     Standing       Arm extension 10x Orange     Tricep extension 10x Orange                   Reviewed care plan and completed assessment   X Cervical extension 32 degrees; cervical flexion WNL; lateral flexion 21 degrees Right, 39 degrees Left; Rotation 44 degrees Right, 45 degrees Left     Specific Interventions Next Treatment: Neck ROM, soft tissue mobilization, general postural education/mechanics, scapular stabilization    Activity/Treatment Tolerance:  [x]  Patient tolerated treatment well  []  Patient limited by fatigue  []  Patient limited by pain   []  Patient limited by medical complications  []  Other:     Assessment: Patient reporting subjective improvement with her neck pain and radicular symptoms. She also has objective improvement with Neck Disability Index by 6 point improvement. She expresses that she has some new cognitive issues that seemingly only affect her in the morning in addition to concerns about the weather. Patient does not drive and has concerns about transportation from her home. Patient is agreeable to discharge from services while continuing HEP. She was recommended to follow up with her PCP about the new cognitive issues and to attempt to contact her pain management doctor if worsening while attempting to schedule to a facility closer to home. GOALS:  Patient Goal: To have less pain    Short Term Goals:   Time Frame: 4 weeks     -Patient will report decreased neck pain to less than or equal to 3/10 during therapy exercises in order to reduce use of modalities or pain medications. GOAL MET:  1/10 at rest.  Discontinue Goal    -Patient will improve AROM of bilateral cervical rotation to at least 45 degrees in order to complete functional movements with less difficulty or hesitation. GOAL NOT MET: 44 degrees on Right and 45 degrees on Left. Discontinue Goal    -Patient will improve AROM of bilateral cervical lateral flexion to at least 30 degrees in order to complete functional movements with less difficulty or hesitation. GOAL NOT MET: 21 degrees to Right. Discontinue Goal    -Patient will MMT greater than or equal to  4+/5 in all cervical motions in order to improve mobility of neck. GOAL NOT MET: Lateral flexion and rotation grossly 4-/5. Discontinue Goal    -Patient will demonstrate improved postural strength/awareness as evidenced by scapular retraction while sitting without cues. GOAL NOT MET: Continues to have some rounding of shoulders. Discontinue Goal           Long Term Goals:   Time Frame: 8 weeks    -Patient will report less than or equal to 1/10 pain in neck in order to improve quality of life. GOAL NOT MET: 4/10 at worst.  Discontinue Goal    -Patient will report improved quality of life as evidenced by NDI score less than or equal to 10/45. GOAL NOT MET: 11/45. Discontinue Goal    -Patient will have no increased pain with pushing, pulling, and lifting activities in order to improve functional movement patterns. GOAL NOT MET: Continue to promote posture. Discontinue Goal    -Patient will demonstrate understanding of HEP upon discharge in order to maintain good mobility upon discharge. GOAL NOT MET: Ongoing. Discontinue Goal          Patient Education:   X  HEP/Education Completed: Posture exercises. Discharge recommendations  [facility] Access Code:  []  No new Education completed  []  Reviewed Prior HEP      [x]  Patient verbalized and/or demonstrated understanding of education provided. []  Patient unable to verbalize and/or demonstrate understanding of education provided. Will continue education.   []  Barriers to learning:     PLAN:  Treatment Recommendations: Strengthening, Range of Motion, Neuromuscular Re-education, Manual Therapy - Soft Tissue Mobilization, Pain Management, Home Exercise Program, Patient Education, Integrative Dry Needling, and Modalities    []  Plan of care initiated. Plan to see patient 2 times per week for 8 weeks to address the treatment planned outlined above.   []  Continue with current plan of care  []  Modify plan of care as follows:    []  Hold pending physician visit  [x]  Discharge    Time In 1531   Time Out 1602   Timed Code Minutes: 32 min   Total Treatment Time: 32 min       Electronically Signed by: Kashif Curtis PT

## 2022-10-20 NOTE — PROGRESS NOTES
[address] [address]  Dept: [phone]  Dept Fax: [phone]  Loc: [phone]    Follow-up Visit  Visit Date: 10/20/2022      Tara Cantrell  is a 62 y.o. female who is returning to the office today for a follow-up visit address neck pain. She was last seen and evaluated in our office setting on 9/9/2022 and previously been seen and evaluated by Dr. Chris Ramirez while an inpatient on 8/18/2022. Flexion-extension x-rays were reviewed at that visit and were absent any significant instability. Upcoming appointments with pain management for injection therapies were noted and she was encouraged to keep and maintain those appointments. She arrives today accompanied by her daughter and is much more comfortable stating that pain management therapies are providing significant relief. He is also undergoing physical therapy which she states may or may not be improving her symptoms but has improved range of motion and mobility. On exam today she was intact for strength and sensation for upper extremity groups bilaterally and symmetrically and demonstrated excellent range of cervical motion. Based on the stable intact nature of today's exam and improvements from pain management therapies we have agreed to treat this conservatively moving forward and have encouraged her to keep and maintain upcoming appointments with pain management. Her next pain management appointment is December 14, we will arrange a follow-up appointment for her after that date to ascertain continued improvements based on that therapy or to revisit other intervention options both surgical and nonsurgical.  She is encouraged in the interim to contact her office with any additional questions or concerns. Remains very happy with today's visit and with the plan moving forward. Patient was evaluated today and is doing well overall.   No new complaints were voiced. Patient  lives with their family  Wound: none  Follow-up Studies: No orders of the defined types were placed in this encounter. Assessment/Plan:  Status Post evaluation of cervical pain  Doing well overall  Encouraged gradual increase in physical and mental activity. Fall precaution and home safety education provided to patient. Follow-up: A 2-month follow-up to coincide with the completion of a pain management appointment scheduled for 14 December.       Electronically signed by Jenny Hurtado PA-C on 10/20/22 at 2:25 PM EDT

## 2022-10-24 ENCOUNTER — APPOINTMENT (OUTPATIENT)
Dept: PHYSICAL THERAPY | Age: 57
End: 2022-10-24
Payer: MEDICAID

## 2022-10-27 ENCOUNTER — APPOINTMENT (OUTPATIENT)
Dept: PHYSICAL THERAPY | Age: 57
End: 2022-10-27
Payer: MEDICAID

## 2022-12-14 ENCOUNTER — OFFICE VISIT (OUTPATIENT)
Dept: PHYSICAL MEDICINE AND REHAB | Age: 57
End: 2022-12-14
Payer: MEDICAID

## 2022-12-14 ENCOUNTER — TELEPHONE (OUTPATIENT)
Dept: PHYSICAL MEDICINE AND REHAB | Age: 57
End: 2022-12-14

## 2022-12-14 VITALS
DIASTOLIC BLOOD PRESSURE: 98 MMHG | HEIGHT: 63 IN | BODY MASS INDEX: 26.22 KG/M2 | WEIGHT: 148 LBS | SYSTOLIC BLOOD PRESSURE: 142 MMHG

## 2022-12-14 DIAGNOSIS — M79.18 MYOFASCIAL PAIN: ICD-10-CM

## 2022-12-14 DIAGNOSIS — M54.16 LUMBAR RADICULOPATHY: ICD-10-CM

## 2022-12-14 DIAGNOSIS — M47.816 LUMBAR SPONDYLOSIS: ICD-10-CM

## 2022-12-14 DIAGNOSIS — M54.12 CERVICAL RADICULOPATHY: ICD-10-CM

## 2022-12-14 DIAGNOSIS — M48.02 CERVICAL SPINAL STENOSIS: ICD-10-CM

## 2022-12-14 DIAGNOSIS — G89.4 CHRONIC PAIN SYNDROME: ICD-10-CM

## 2022-12-14 DIAGNOSIS — M47.812 FACET HYPERTROPHY OF CERVICAL REGION: ICD-10-CM

## 2022-12-14 DIAGNOSIS — M48.061 SPINAL STENOSIS OF LUMBAR REGION WITHOUT NEUROGENIC CLAUDICATION: ICD-10-CM

## 2022-12-14 DIAGNOSIS — M47.816 FACET HYPERTROPHY OF LUMBAR REGION: ICD-10-CM

## 2022-12-14 DIAGNOSIS — M48.02 FORAMINAL STENOSIS OF CERVICAL REGION: ICD-10-CM

## 2022-12-14 DIAGNOSIS — M47.812 CERVICAL SPONDYLOSIS: Primary | ICD-10-CM

## 2022-12-14 PROCEDURE — 3078F DIAST BP <80 MM HG: CPT | Performed by: NURSE PRACTITIONER

## 2022-12-14 PROCEDURE — 99214 OFFICE O/P EST MOD 30 MIN: CPT | Performed by: NURSE PRACTITIONER

## 2022-12-14 PROCEDURE — G8427 DOCREV CUR MEDS BY ELIG CLIN: HCPCS | Performed by: NURSE PRACTITIONER

## 2022-12-14 PROCEDURE — 4004F PT TOBACCO SCREEN RCVD TLK: CPT | Performed by: NURSE PRACTITIONER

## 2022-12-14 PROCEDURE — 3017F COLORECTAL CA SCREEN DOC REV: CPT | Performed by: NURSE PRACTITIONER

## 2022-12-14 PROCEDURE — 3074F SYST BP LT 130 MM HG: CPT | Performed by: NURSE PRACTITIONER

## 2022-12-14 PROCEDURE — G8484 FLU IMMUNIZE NO ADMIN: HCPCS | Performed by: NURSE PRACTITIONER

## 2022-12-14 PROCEDURE — G8419 CALC BMI OUT NRM PARAM NOF/U: HCPCS | Performed by: NURSE PRACTITIONER

## 2022-12-14 RX ORDER — BACLOFEN 10 MG/1
10 TABLET ORAL NIGHTLY PRN
Qty: 30 TABLET | Refills: 0 | Status: SHIPPED | OUTPATIENT
Start: 2022-12-14 | End: 2023-01-13

## 2022-12-14 NOTE — PROGRESS NOTES
Chronic Pain/PM&R Clinic Note     Encounter Date: 12/14/22    Subjective:   Chief Complaint:   Chief Complaint   Patient presents with    Follow-up     Pt. Would like to discuss physical therapy and medications. History of Present Illness:   Cayla Garcia is a 62 y.o. female seen in the clinic initially on 09/14/2022 upon request from [place], [place] for her history of neck pain. She has a personal medical history of C5-6 fusion 2002, hypertension, CAD, COPD, hyperlipidemia, stents placed, anticoagulation, heart failure. Patient presents today with complaints of left-sided neck pain that does go down into the shoulder, shoulder blade area and into the left-sided ribs. She reports this has been occurring for 1 year. She denies any accident, falls, accidents that caused her pain at that time. She states it started out of nowhere and just continue to worsen over the last year. She describes the pain as numbness, jabbing, burning, tingling, shooting pain. She states it feels \"as though someone is ripping my body apart\". She states pain is worse at night, laying down, lifting, moving her head. She states she takes hot baths with some relief tried ice with no relief. She has not done any formal physical therapy. She states she tries to stretch at home but does not do any home exercises. She denies any weakness, dropping things. She does have a history of migraines, states she feels like she gets headaches with this pain that does wrap around the whole head. She also reports she has complaints of left-sided low back pain that does go into the buttocks and hip, that does go down her left leg to her toes. She states it is a constant pain, that has been occurring for 1 month. She denies any accident, injury, falls that caused the pain at that time. She describes it as a pressure, squeezing, muscle cramp. She denies any numbness, tingling, loss of bowel or bladder.   She has not had any formal physical therapy for this. She states standing too long, walking makes it worse. Hot baths and stretching help. Pain scale : at worst pain is 10/10, at best pain is 3/10. Today, 2022, patient presents for planned follow-up. At previous visit we did trigger point injections, and sent her to physical therapy. She is here to review both. She reports that the trigger point injections completed gave her great relief, 90% relief of her pain for about 2 weeks. She states she was able to  her grandson, help take care of him, tolerate more activities. She states pain then started to come back and is down to baseline. She states she also went to physical therapy, was getting really good relief but had to stop due to transportation issues. She is asking about getting a new order to go to a place closer to home. She states she does feel like her neck pain is worse, states it almost feels swollen. She states it is now going down the left side to her fingertips. She denies any falls, accident that made it worse. She states she is using the baclofen with relief, and will use occasional ibuprofen. History of Interventions:   Surgery: C5-6 FUSION with Dr Hema De Oliveira   Injections: Trigger point injections-relief x2 weeks    Current Treatment Medications:   Ibuprofen- eases the pain    Historical Treatment Medications:   Norco 5/325- relief   Flexeril- relief  Prednisone- relief   Voltaren- relief     Imagin2022 CT CERVICAL   PROCEDURE: CT CERVICAL SPINE WO CONTRAST       CLINICAL INFORMATION: rule out significant adjacent level disease. Neck pain for 2 weeks. COMPARISON: MRI cervical spine dated 2022. TECHNIQUE: 3 mm noncontrast axial images were obtained through the cervical spine with sagittal and coronal reconstructions.        All CT scans at this facility use dose modulation, iterative reconstruction, and/or weight-based dosing when appropriate to reduce radiation dose to as low as reasonably achievable. FINDINGS:   Redemonstration of fusion of C5-6 with anterior screw-plate fixation bridging this level. There is no evidence of hardware failure or loosening. There is associated straightening of the cervical lordosis. There is minimal, grade 1, anterolisthesis of C2    relative to C3 and C3 relative to C4 on the basis of degenerative change, stable compared to prior MRI. There is disc space narrowing throughout the nonfused levels of the cervical spine, similar to prior MRI. No fracture of the cervical vertebral column    is identified. The craniocervical junction appears intact. No paraspinal or epidural fluid collection is identified. Paraspinal soft tissues are grossly unremarkable. At C2-3 there is no significant spinal canal or neuroforaminal stenosis. At C3-4 there is partial uncovering the disc with superimposed shallow disc bulge without significant spinal canal stenosis. There is moderate left neural frontal stenosis in association with facet hypertrophy. At C4-5 there is a shallow disc bulge which causes mild spinal canal stenosis. There is mild left and moderate right neuroforaminal stenosis in association with uncovertebral joint degenerative change. At C5-6 there is no significant spinal canal or neuroforaminal stenosis. At C6-7 there is a shallow disc bulge without significant spinal canal stenosis. There is mild bilateral foraminal stenosis in association with mild uncovertebral joint degenerative change. At C7-T1 there is no significant spinal canal or neuroforaminal stenosis. Impression       1. Redemonstration of ACDF bridging C5-6 without evidence of acute abnormality. 2. Multilevel degenerative changes of the cervical spine with areas of up to mild spinal canal stenosis and moderate neural foraminal stenosis.        Mri cervical 7/20/2022  MR cervical spine without gadolinium       Comparison: None       Findings:   Visualized intracranial contents are unremarkable. No cervical fluid collections or masses. Cervical cord normal.       Status post anterior metallic and interbody fusion at C5-C6. Appropriate    alignment at the surgical level. No evidence of hardware failure. Grade 1    anterolisthesis of C3 on C4. Remaining cervical alignment unremarkable. C2-C3: Partial disc desiccation. No disc bulge or herniation. No central    canal or neural foraminal narrowing. C3-C4: Grade 1 anterolisthesis. 4 mm central disc protrusion with moderate    mass-effect on the thecal sac and mild mass-effect on the anterior aspect    of the cord. Moderate osteoarthritis of the left facet joint. Moderate    narrowing of the left neural foramen. No significant narrowing of the    right neural foramen. C4-C5: Small disc osteophyte complex with moderate thecal sac effacement. No significant neural foraminal narrowing. C5-C6: Near complete ankylosis of the vertebral bodies. Bony proliferation    at the posterior margin of the disc with mild thecal sac effacement. No    significant neural foraminal narrowing. C6-C7: Disc osteophyte complex. No significant central canal narrowing. Mild stenosis of bilateral neural foramina. C7-T1: Normal           Impression   1. Status post anterior metallic and interbody fusion at C5-C6. Unremarkable postoperative appearance. 2. Grade 1 anterolisthesis and 4 mm central disc protrusion at C3-C4 with    moderate mass-effect on the thecal sac and mild mass-effect on the    anterior aspect of the cervical spinal cord. 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 LUMBAR MRI  7/20/2022  MR lumbar spine without gadolinium       Comparison: None       Findings:   No scoliosis or spondylolisthesis. No acute fracture or pathologic bone lesion. Possible chronic pars defects    at L5.  Small hemangiomas incidentally noted. The conus is at the T12 level. Paraspinous musculature intact. L1-L2: Mild broad-based disc bulge with minimal thecal sac effacement. No    significant neural foraminal narrowing. L2-L3: Minimal broad-based disc bulge. No significant central canal or    neural foraminal narrowing. L3-L4: Mild broad-based disc bulge with minimal thecal sac effacement. Mild facet osteoarthritis. No significant neural foraminal narrowing. L4-L5: 3 mm central disc protrusion with mild thecal sac effacement. Mild    facet osteoarthritis. Mild bilateral neural foraminal narrowing. L5-S1: 2 mm central disc protrusion with mild thecal sac effacement. Mild    facet osteoarthritis. Mild bilateral neural foraminal narrowing. Impression   1. Possible chronic pars defects at L5. No associated alignment    abnormality. 2. Tiny central disc protrusions at L4-L5 and L5-S1 with mild thecal sac    effacement. 3. Additional chronic findings as above. Past Medical History:   Diagnosis Date    CAD (coronary artery disease)     COPD (chronic obstructive pulmonary disease) ([facility] [address]     Hyperlipidemia     Hypertension        Past Surgical History:   Procedure Laterality Date    BREAST SURGERY      CARDIAC SURGERY         No family history on file. Medications & Allergies:   Current Outpatient Medications   Medication Instructions    amLODIPine (NORVASC) 10 mg, Oral, 2 TIMES DAILY    aspirin 81 mg, Oral, DAILY    atorvastatin (LIPITOR) 80 mg, Oral, DAILY    carvedilol (COREG) 25 mg, Oral, 2 TIMES DAILY WITH MEALS    clopidogrel (PLAVIX) 75 mg, Oral, DAILY    cyclobenzaprine (FLEXERIL) 5 MG tablet TAKE 1 TABLET BY MOUTH THREE TIMES DAILY    diclofenac sodium (VOLTAREN) 4 g, Topical, PRN    hydrALAZINE (APRESOLINE) 25 mg, Oral, 2 times daily    lisinopril (PRINIVIL;ZESTRIL) 10 mg, Oral, DAILY    nitroGLYCERIN (NITROSTAT) 0.3 mg, SubLINGual, EVERY 5 MIN PRN, up to max of 3 total doses.  If no relief after 1 dose, call 911.    spironolactone (ALDACTONE) 50 mg, Oral, DAILY    SUMAtriptan (IMITREX) 50 mg, Oral, ONCE PRN       No Known Allergies    Review of Systems:   Constitutional: negative for weight changes or fevers  Genitourinary: negative for bowel/bladder incontinence   Musculoskeletal: positive for left-sided neck pain, left-sided low back and left leg pain  Neurological: negative for any leg weakness or numbness/tingling  Behavioral/Psych: negative for anxiety/depression   All other systems reviewed and are negative    Objective:     Vitals:    12/14/22 1252   BP: (!) 142/98       Constitutional: Pleasant, no acute distress   Head: Normocephalic, atraumatic   Eyes: Conjunctivae normal   Neck: Supple, symmetrical   Respiration: Non-labored breathing   Cardiovascular: Limbs warm and well perfused   Musculoskeletal: decreased cervical ROM. Negative Spurling maneuver bilaterally. increased pain with facet loading. tenderness to palpation in left cervical paraspinals and other posterior neck musculature. Neuro: Alert, oriented. CN II-XII appear grossly intact. Motor strength 5/5 SAb, EF, EE, WE, Salvatore. LT sensation intact in upper limbs. Skin: no skin rashes or lesions noted   Psychological: Cooperative, no exaggerated pain behaviors      Musculoskeletal: Muscle bulk symmetric, no atrophy, no gross deformities   · Lower Extremities: ROM WNL. · Thorax: No paraspinal tenderness bilaterally. No scoliosis or kyphosis. · Lumbar Spine: ROM reduced. Lumbar paraspinals tender to palpation left side. SLR positive left. LYLE pos left side. GAENSLEN pos left side. Positive facet loading left. Bilateral greater trochanters non-tender to palpation. Neurological: Cranial nerves II-XII grossly intact. · Gait - Normal, non-antalgic gait. Ambulates without assistive device.    · Motor: 5/5 muscle strength in bilateral hip flexion, knee flexion, knee extension, ankle dorsiflexion, and ankle plantar flexion   · Sensory: LT sensation intact in lower limbs   · Reflexes: 2+ symmetrical in bilateral achilles, 2+ bilateral patellar  Skin: No rashes or lesions present   Psychological: Cooperative, no exaggerated pain behaviors       Assessment:    Diagnosis Orders   1. Cervical spondylosis        2. Facet hypertrophy of cervical region        3. Cervical spinal stenosis        4. Lumbar radiculopathy        5. Spinal stenosis of lumbar region without neurogenic claudication        6. Facet hypertrophy of lumbar region        7. Lumbar spondylosis        8. Chronic pain syndrome        9. Myofascial pain               Donny Chao is a 62 y. o.female presenting to the pain clinic for evaluation of neck pain, with complaints of left-sided low back and leg pain. Discussed possible injections to help with pain control in the future such as cervical facet series at left C3-4, C4-5, cervical epidural steroid injections, trigger point injections, TFLESI @L5-S1, lumbar facet series @ L4-5,L5-S1. We will start with trigger point injections into the left-sided cervical area, see note below. I also started her on baclofen nightly, Voltaren as needed, and ordered formal physical therapy with dry needling. I will see her back in 10 to 12 weeks after therapy to reevaluate. Did discuss repeat trigger point injections in the future. With new radicular symptoms of the cervical concerns, I have set her up for a transforaminal cervical epidural steroid injection at C6-7 on the left side. She is encouraged to continue her baclofen, Voltaren. I have also read ordered physical therapy for her to take closer to home. We did discuss pending results of TFCESI, possibly trialing Lyrica. I will see her back after procedures      Plan: The following treatment recommendations and plan were discussed in detail with Donny Chao.     Imaging:   I have reviewed patients imaging of cervical XR, Lumbar XR, Cervical MRI, CT, lumbar MRI and results were discussed with patient today. Analgesics:   Patient is taking Ibuprofen. Patient is advised to take as prescribed and not take on an empty stomach. Adjuvants: For continued chronic pain with associated musculoskeletal component, the patient is advised to continue baclofen 10 mg nightly PRN, Voltaren gel PRN    Interventions: In presence of lumbosacral radiating pain, the option of  left transforaminal epidural steroid injection approach at C6-7 was chosen. The risks and benefits were discussed in detail with the patient. Patient wants to proceed with the injection with Dr Alaina Ray    Anticoagulation/NPO Recommendations:   Patient does need to hold any medications prior to the procedure - Plavix (Dr Es Rico at [facility])  Patient will need to be NPO x 8 hours prior to the procedure. We will start an IV prior to the procedure    Multidisciplinary Pain Management:   In the presence of complex, chronic, and multi-factorial pain, the importance of a multidisciplinary approach to pain management in the patients management regimen was emphasized and discussed in great detail. PHYSICAL THERAPY: Patient is advised to see a physical therapist for gentle stretching exercises and conditioning exercises for management of pain. PSYCHOLOGY: Patient is advised to see a clinical pain psychologist, for the psychosocial aspect of pain care through coping skills, relaxation strategies, cognitive group therapy etc.   SMOKING: Impact of smoking in patient's pain was discussed and patient is counseled against smoking. The patient was also advised to continue physical therapy and stretching exercises at home and cognitive behavioral and/or group therapy. Referrals:  Physical therapy-[place]    Prescriptions Written This Visit:   Baclofen  Voltaren    Follow-up:   After procedures    It was my pleasure to evaluate Ana Dolan today.   I spent over 35 minutes evaluating this patient, reviewing previous notes and images and completing documentation.        Daya Ku, APRN - CNP   Interventional Pain Management/PM&R   [place]

## 2023-02-06 ENCOUNTER — TELEPHONE (OUTPATIENT)
Dept: PHYSICAL MEDICINE AND REHAB | Age: 58
End: 2023-02-06

## 2023-02-06 NOTE — TELEPHONE ENCOUNTER
[facility] procedure denial received. Scanned into media. Denied due to no documentation of how long the pain has been present and that the pain is a affecting ADL with a rating >4 on the 1-10 scale. No documentation of PT to the neck region for a minimum of 12 sessions over 4 weeks or documentation that explains why PT is contraindicated. Must have a minimum of 6 weeks activity modification and trial of medications for pain. Procedure and follow up cancelled at this time. Pt. Called. Unable to LVM as VM full.

## 2023-02-08 NOTE — TELEPHONE ENCOUNTER
Pt, called. Unable to LVM as VM is full. Kofi-son (MARQUIS) called. LVM with him to have pt. Call the office. Phone number given.